# Patient Record
Sex: FEMALE | Race: WHITE | NOT HISPANIC OR LATINO | URBAN - METROPOLITAN AREA
[De-identification: names, ages, dates, MRNs, and addresses within clinical notes are randomized per-mention and may not be internally consistent; named-entity substitution may affect disease eponyms.]

---

## 2020-01-29 ENCOUNTER — OFFICE VISIT (OUTPATIENT)
Dept: FAMILY MEDICINE CLINIC | Facility: CLINIC | Age: 60
End: 2020-01-29
Payer: COMMERCIAL

## 2020-01-29 VITALS
WEIGHT: 158.2 LBS | RESPIRATION RATE: 16 BRPM | SYSTOLIC BLOOD PRESSURE: 128 MMHG | HEIGHT: 61 IN | HEART RATE: 82 BPM | DIASTOLIC BLOOD PRESSURE: 80 MMHG | OXYGEN SATURATION: 98 % | TEMPERATURE: 99 F | BODY MASS INDEX: 29.87 KG/M2

## 2020-01-29 DIAGNOSIS — L98.9 SKIN LESION: ICD-10-CM

## 2020-01-29 DIAGNOSIS — Z29.9 PREVENTIVE MEASURE: ICD-10-CM

## 2020-01-29 DIAGNOSIS — Z13.220 LIPID SCREENING: ICD-10-CM

## 2020-01-29 DIAGNOSIS — E66.09 CLASS 1 OBESITY DUE TO EXCESS CALORIES WITHOUT SERIOUS COMORBIDITY WITH BODY MASS INDEX (BMI) OF 30.0 TO 30.9 IN ADULT: ICD-10-CM

## 2020-01-29 DIAGNOSIS — Z12.31 SCREENING MAMMOGRAM, ENCOUNTER FOR: ICD-10-CM

## 2020-01-29 DIAGNOSIS — Z11.4 ENCOUNTER FOR SCREENING FOR HIV: ICD-10-CM

## 2020-01-29 DIAGNOSIS — Z00.00 WELL ADULT EXAM: Primary | ICD-10-CM

## 2020-01-29 DIAGNOSIS — M12.9 ARTHRITIS, MULTIPLE JOINT INVOLVEMENT: ICD-10-CM

## 2020-01-29 DIAGNOSIS — Z11.59 ENCOUNTER FOR HEPATITIS C SCREENING TEST FOR LOW RISK PATIENT: ICD-10-CM

## 2020-01-29 PROBLEM — M79.671 PAIN IN RIGHT FOOT: Status: ACTIVE | Noted: 2017-03-08

## 2020-01-29 PROBLEM — M24.573 EQUINUS CONTRACTURE OF ANKLE: Status: ACTIVE | Noted: 2017-03-08

## 2020-01-29 PROBLEM — G56.00 CARPAL TUNNEL SYNDROME: Status: ACTIVE | Noted: 2020-01-29

## 2020-01-29 PROBLEM — M21.40 PES PLANUS: Status: ACTIVE | Noted: 2017-03-08

## 2020-01-29 PROBLEM — M18.9 OSTEOARTHRITIS OF CARPOMETACARPAL (CMC) JOINT OF THUMB: Status: ACTIVE | Noted: 2020-01-29

## 2020-01-29 PROBLEM — M76.829 TIBIALIS POSTERIOR TENDINITIS: Status: ACTIVE | Noted: 2017-03-08

## 2020-01-29 PROBLEM — Z80.3 FAMILY HISTORY OF BREAST CANCER IN MOTHER: Status: ACTIVE | Noted: 2020-01-29

## 2020-01-29 PROCEDURE — 99386 PREV VISIT NEW AGE 40-64: CPT | Performed by: FAMILY MEDICINE

## 2020-01-29 PROCEDURE — 3008F BODY MASS INDEX DOCD: CPT | Performed by: FAMILY MEDICINE

## 2020-01-29 RX ORDER — MELATONIN
1000 DAILY
COMMUNITY
End: 2020-02-11

## 2020-01-29 RX ORDER — MULTIVITAMIN
1 TABLET ORAL DAILY
COMMUNITY
End: 2020-02-11

## 2020-01-29 RX ORDER — ASPIRIN 81 MG/1
81 TABLET, CHEWABLE ORAL DAILY
COMMUNITY
End: 2020-11-23

## 2020-01-29 NOTE — PATIENT INSTRUCTIONS
Please give info for Shingles  Heart Healthy Diet   AMBULATORY CARE:   A heart healthy diet  is an eating plan low in total fat, unhealthy fats, and sodium (salt)  A heart healthy diet helps decrease your risk for heart disease and stroke  Limit the amount of fat you eat to 25% to 35% of your total daily calories  Limit sodium to less than 2,300 mg each day  Healthy fats:  Healthy fats can help improve cholesterol levels  The risk for heart disease is decreased when cholesterol levels are normal  Choose healthy fats, such as the following:  · Unsaturated fat  is found in foods such as soybean, canola, olive, corn, and safflower oils  It is also found in soft tub margarine that is made with liquid vegetable oil  · Omega-3 fat  is found in certain fish, such as salmon, tuna, and trout, and in walnuts and flaxseed  Unhealthy fats:  Unhealthy fats can cause unhealthy cholesterol levels in your blood and increase your risk of heart disease  Limit unhealthy fats, such as the following:  · Cholesterol  is found in animal foods, such as eggs and lobster, and in dairy products made from whole milk  Limit cholesterol to less than 300 milligrams (mg) each day  You may need to limit cholesterol to 200 mg each day if you have heart disease  · Saturated fat  is found in meats, such as landry and hamburger  It is also found in chicken or turkey skin, whole milk, and butter  Limit saturated fat to less than 7% of your total daily calories  Limit saturated fat to less than 6% if you have heart disease or are at increased risk for it  · Trans fat  is found in packaged foods, such as potato chips and cookies  It is also in hard margarine, some fried foods, and shortening  Avoid trans fats as much as possible    Heart healthy foods and drinks to include:  Ask your dietitian or healthcare provider how many servings to have from each of the following food groups:  · Grains:      ¨ Whole-wheat breads, cereals, and pastas, and brown rice    ¨ Low-fat, low-sodium crackers and chips    · Vegetables:      ¨ Broccoli, green beans, green peas, and spinach    ¨ Collards, kale, and lima beans    ¨ Carrots, sweet potatoes, tomatoes, and peppers    ¨ Canned vegetables with no salt added    · Fruits:      ¨ Bananas, peaches, pears, and pineapple    ¨ Grapes, raisins, and dates    ¨ Oranges, tangerines, grapefruit, orange juice, and grapefruit juice    ¨ Apricots, mangoes, melons, and papaya    ¨ Raspberries and strawberries    ¨ Canned fruit with no added sugar    · Low-fat dairy products:      ¨ Nonfat (skim) milk, 1% milk, and low-fat almond, cashew, or soy milks fortified with calcium    ¨ Low-fat cheese, regular or frozen yogurt, and cottage cheese    · Meats and proteins , such as lean cuts of beef and pork (loin, leg, round), skinless chicken and turkey, legumes, soy products, egg whites, and nuts  Foods and drinks to limit or avoid:  Ask your dietitian or healthcare provider about these and other foods that are high in unhealthy fat, sodium, and sugar:  · Snack or packaged foods , such as frozen dinners, cookies, macaroni and cheese, and cereals with more than 300 mg of sodium per serving    · Canned or dry mixes  for cakes, soups, sauces, or gravies    · Vegetables with added sodium , such as instant potatoes, vegetables with added sauces, or regular canned vegetables    · Other foods high in sodium , such as ketchup, barbecue sauce, salad dressing, pickles, olives, soy sauce, and miso    · High-fat dairy foods  such as whole or 2% milk, cream cheese, or sour cream, and cheeses     · High-fat protein foods  such as high-fat cuts of beef (T-bone steaks, ribs), chicken or turkey with skin, and organ meats, such as liver    · Cured or smoked meats , such as hot dogs, landry, and sausage    · Unhealthy fats and oils , such as butter, stick margarine, shortening, and cooking oils such as coconut or palm oil    · Food and drinks high in sugar , such as soft drinks (soda), sports drinks, sweetened tea, candy, cake, cookies, pies, and doughnuts  Other diet guidelines to follow:   · Eat more foods containing omega-3 fats  Eat fish high in omega-3 fats at least 2 times a week  · Limit alcohol  Too much alcohol can damage your heart and raise your blood pressure  Women should limit alcohol to 1 drink a day  Men should limit alcohol to 2 drinks a day  A drink of alcohol is 12 ounces of beer, 5 ounces of wine, or 1½ ounces of liquor  · Choose low-sodium foods  High-sodium foods can lead to high blood pressure  Add little or no salt to food you prepare  Use herbs and spices in place of salt  · Eat more fiber  to help lower cholesterol levels  Eat at least 5 servings of fruits and vegetables each day  Eat 3 ounces of whole-grain foods each day  Legumes (beans) are also a good source of fiber  Lifestyle guidelines:   · Do not smoke  Nicotine and other chemicals in cigarettes and cigars can cause lung and heart damage  Ask your healthcare provider for information if you currently smoke and need help to quit  E-cigarettes or smokeless tobacco still contain nicotine  Talk to your healthcare provider before you use these products  · Exercise regularly  to help you maintain a healthy weight and improve your blood pressure and cholesterol levels  Ask your healthcare provider about the best exercise plan for you  Do not start an exercise program without asking your healthcare provider  Follow up with your healthcare provider as directed:  Write down your questions so you remember to ask them during your visits  © 2017 [employer] Information is for End User's use only and may not be sold, redistributed or otherwise used for commercial purposes  All illustrations and images included in CareNotes® are the copyrighted property of [employer]  or Mario Malave  The above information is an  only   It is not intended as medical advice for individual conditions or treatments  Talk to your doctor, nurse or pharmacist before following any medical regimen to see if it is safe and effective for you

## 2020-01-29 NOTE — PROGRESS NOTES
Assessment/Plan:     1  Well adult exam  See below  2  Arthritis, multiple joint involvement  Update labs  Exam consistent with OA  Encouraged her to continue her stretching and exercise to stay limber  Call if pain is worsening   - Comprehensive metabolic panel; Future  - Vitamin D 25 hydroxy; Future  - RF Screen w/ Reflex to Titer; Future  - Comprehensive metabolic panel  - Vitamin D 25 hydroxy    3  Class 1 obesity due to excess calories without serious comorbidity with body mass index (BMI) of 30 0 to 30 9 in adult  Encouraged diet and exercise to help for a healthier BMI  Her diet is very healthy - work on a little more exercise  4  Skin lesion  Pt already has derm appointment schedule in the next 2 months for lip lesion  ?[facility]     5  Screening mammogram, encounter for  Prescription:  - Mammo screening bilateral w cad; Future    6  Preventive measure  Refer to GYN for pap update   - Ambulatory referral to Gynecology; Future    7  Encounter for hepatitis C screening test for low risk patient  Agrees to   - Hepatitis C antibody; Future    8  Encounter for screening for HIV  Agrees to   - HIV 1/2 AG-AB combo    9  Lipid screening  Update labs   - Lipid panel; Future  - Lipid panel      Well adult exam  ·         Continue healthy diet   ·         Encourage exercise 4 times a week or more for minimum 30 minutes  ·         Continue to see dentist, wear seatbelt  ·         Health maintenance reviewed and up-to-date  Old records requested to get dates of colonoscopy and immunizations  Declines flu shot  Thinks tetanus is up to date  Agrees to update mammo and see GYN to update pap  Reviewed age appropriate health maintenance screenings and immunizations that are due, risks and benefits of these     Health Maintenance   Topic Date Due    Hepatitis C Screening  1960    MAMMOGRAM  1960    CRC Screening: Colonoscopy  1960    DTaP,Tdap,and Td Vaccines (1 - Tdap) 07/20/1972    HIV Screening 07/20/1976    Cervical Cancer Screening  07/20/1982    Influenza Vaccine  02/25/2020 (Originally 7/1/2019)    Depression Screening PHQ  01/29/2021    BMI: Followup Plan  01/29/2021    BMI: Adult  01/29/2021    Annual Physical  01/29/2021    Pneumococcal Vaccine: 65+ Years (1 of 2 - PCV13) 07/20/2026    Pneumococcal Vaccine: Pediatrics (0 to 5 Years) and At-Risk Patients (6 to 59 Years)  Aged Out    HIB Vaccine  Aged Out    Hepatitis B Vaccine  Aged Out    IPV Vaccine  Aged Out    Hepatitis A Vaccine  Aged Out    Meningococcal ACWY Vaccine  Aged Out    HPV Vaccine  Aged Out     Return in about 1 year (around 1/29/2021) for Annual physical     Subjective:    HPI Marlee Peabody is a 63 y o  female who presents today for a physical      Chief Complaint   Patient presents with    Physical Exam     est care, no concerns, PCP Dr Bree Moore, [facility] Dr Shelby Coats in [place],      PHQ-9 Depression Screening    PHQ-9:    Frequency of the following problems over the past two weeks:       Little interest or pleasure in doing things:  0 - not at all  Feeling down, depressed, or hopeless:  0 - not at all  PHQ-2 Score:  0        ---Above per clinical staff & reviewed  ---  Patient here today for a physical:    Diet: tries to eat right   B: oatmeal or cereal or eggs   L; half sandwich fruit and yogurt   D: soup or salad  Once a week pizza  Protein bars for snacks   Drinks water or tea   Exercise:  Joined gym - plans to go 4 times a week   Dental visits:  Yes   Seatbelt: yes     Concerns today:  arthritis in hands started 5 years ago maybe  Just the way it looks more bothersome  somein neck cracking  Some stiffness  Lesion on her lip   Concerned about her weight and cannot lose       Colonoscopy 2 -3 years ago - old records requested   Believes immunizations are up to date - also requested  Declines flu   Agrees to [facility] and to update gyn exam   Menopause about 53 ed[age]   stiffness and nodules in hands   Stretches every day     Sleeps well  No snoring  Weight same           The following portions of the patient's history were reviewed and updated as appropriate: allergies, current medications, past family history, past medical history, past social history, past surgical history and problem list      Current Outpatient Medications   Medication Sig Dispense Refill    aspirin 81 mg chewable tablet Chew 81 mg daily      Calcium Carbonate (CALTRATE 600 PO) Take 1 capsule by mouth daily      cholecalciferol (VITAMIN D3) 1,000 units tablet Take 1,000 Units by mouth daily      Multiple Vitamin (MULTIVITAMIN) tablet Take 1 tablet by mouth daily      Omega-3 Fatty Acids (FISH OIL PO) Take 1 capsule by mouth daily       No current facility-administered medications for this visit  Objective:      /80   Pulse 82   Temp 99 °F (37 2 °C) (Tympanic)   Resp 16   Ht 5' 0 5" (1 537 m)   Wt 71 8 kg (158 lb 3 2 oz)   SpO2 98%   BMI 30 39 kg/m²   BP Readings from Last 3 Encounters:   01/29/20 128/80     Wt Readings from Last 3 Encounters:   01/29/20 71 8 kg (158 lb 3 2 oz)       Review of Systems  ROS:  all others negative - no chest pain, SOB, normal urine and bowels  no GERD  sleeping well  mood good  + skin lesion new in last few months  + joint pain and stiffness edwar hands and neck  Physical Exam   HENT:   Mouth/Throat:          Constitutional: she appears well-developed and well-nourished  HENT: Head: Normocephalic  Right Ear: External ear normal  Tympanic membrane normal    Left Ear: External ear normal  Tympanic membrane normal    Nose: Nose normal  No mucosal edema, No rhinorrhea  Right sinus exhibits no maxillary sinus tenderness  Left sinus exhibits no maxillary sinus tenderness  Mouth/Throat: Oropharynx is clear and moist    Eyes: Normal conjunctiva  No erythema  No discharge  Neck: No pain on exam  Neck supple  Cardiovascular: Normal rate, regular rhythm and normal heart sounds  Pulmonary/Chest: Effort normal and breath sounds normal  No wheezes  No rales  No rhonchi  Abdominal: Soft  Bowel sounds are normal  There is no tenderness  Musculoskeletal: she exhibits no edema  Ayana's & Heberden's nodes  No erythema swelling or increased warmth  Neck full range of motion  Lymphadenopathy: she has no cervical adenopathy  Neurological: she  is alert and oriented to person, place, and time  Skin: Skin is warm and dry  No rashes  Psychiatric: she  has a normal mood and affect  her behavior is normal  Thought content normal    Vitals reviewed  BMI Counseling: Body mass index is 30 39 kg/m²  The BMI is above normal  Nutrition recommendations include decreasing portion sizes  Exercise recommendations include exercising 3-5 times per week

## 2020-02-06 DIAGNOSIS — E87.0 HYPERNATREMIA: Primary | ICD-10-CM

## 2020-02-06 DIAGNOSIS — E78.00 HYPERCHOLESTEROLEMIA: ICD-10-CM

## 2020-02-06 DIAGNOSIS — E67.3 HIGH VITAMIN D LEVEL: ICD-10-CM

## 2020-02-06 DIAGNOSIS — R94.4 DECREASED GLOMERULAR FILTRATION RATE (GFR): ICD-10-CM

## 2020-02-08 LAB
25(OH)D3+25(OH)D2 SERPL-MCNC: 107 NG/ML (ref 30–100)
ALBUMIN SERPL-MCNC: 4.9 G/DL (ref 3.8–4.9)
ALBUMIN/GLOB SERPL: 2 {RATIO} (ref 1.2–2.2)
ALP SERPL-CCNC: 71 IU/L (ref 39–117)
ALT SERPL-CCNC: 20 IU/L (ref 0–32)
AST SERPL-CCNC: 23 IU/L (ref 0–40)
BILIRUB SERPL-MCNC: 0.5 MG/DL (ref 0–1.2)
BUN SERPL-MCNC: 20 MG/DL (ref 6–24)
BUN/CREAT SERPL: 20 (ref 9–23)
CALCIUM SERPL-MCNC: 9.6 MG/DL (ref 8.7–10.2)
CHLORIDE SERPL-SCNC: 104 MMOL/L (ref 96–106)
CHOLEST SERPL-MCNC: 235 MG/DL (ref 100–199)
CO2 SERPL-SCNC: 22 MMOL/L (ref 20–29)
CREAT SERPL-MCNC: 1.01 MG/DL (ref 0.57–1)
GLOBULIN SER-MCNC: 2.5 G/DL (ref 1.5–4.5)
GLUCOSE SERPL-MCNC: 100 MG/DL (ref 65–99)
HCV AB S/CO SERPL IA: <0.1 S/CO RATIO (ref 0–0.9)
HDLC SERPL-MCNC: 72 MG/DL
HIV 1+2 AB+HIV1 P24 AG SERPL QL IA: NON REACTIVE
LDLC SERPL CALC-MCNC: 144 MG/DL (ref 0–99)
POTASSIUM SERPL-SCNC: 4.3 MMOL/L (ref 3.5–5.2)
PROT SERPL-MCNC: 7.4 G/DL (ref 6–8.5)
RHEUMATOID FACT SERPL-ACNC: <10 IU/ML (ref 0–13.9)
SL AMB EGFR AFRICAN AMERICAN: 71 ML/MIN/1.73
SL AMB EGFR NON AFRICAN AMERICAN: 62 ML/MIN/1.73
SL AMB VLDL CHOLESTEROL CALC: 19 MG/DL (ref 5–40)
SODIUM SERPL-SCNC: 147 MMOL/L (ref 134–144)
TRIGL SERPL-MCNC: 94 MG/DL (ref 0–149)

## 2020-02-11 NOTE — PROGRESS NOTES
Please call pt - her blood work came back with high sodium and very high vitamin D    -Please  confirm how much vitamin D she is taking  We have that she is taking 1000 iu along with what is in her calcium  (confirm what that dose is)  -also ask how much water/fluids she drinks a day  ?little ?excess  The sodium being high usually comes from not having enough fluids  - her cholesterol is high - she can work on diet and exercise for this    I want her to hold all vitamin D (multivitamin, supplements, calcium)   Repeat blood work in 2 weeks - do not fast for this test and be sure to be hydrated as usual

## 2020-02-11 NOTE — PROGRESS NOTES
Patient called back and stated that she is taking 5000 iu of Vitamin D, her multi vitamin has 1000 iu of vitamin D, and her calcium with vitamin D has 800 iu of vitamin D  Her calcium is 600 mg  I informed patient of results, patient understood and will stop taking her supplements until getting blood work done again

## 2020-02-13 ENCOUNTER — HOSPITAL ENCOUNTER (OUTPATIENT)
Dept: RADIOLOGY | Age: 60
Discharge: HOME | End: 2020-02-13
Attending: FAMILY MEDICINE
Payer: COMMERCIAL

## 2020-02-13 VITALS — BODY MASS INDEX: 30.23 KG/M2 | HEIGHT: 60 IN | WEIGHT: 154 LBS

## 2020-02-13 DIAGNOSIS — Z12.31 SCREENING MAMMOGRAM, ENCOUNTER FOR: ICD-10-CM

## 2020-02-13 PROCEDURE — 77067 SCR MAMMO BI INCL CAD: CPT

## 2020-02-19 PROBLEM — K57.90 DIVERTICULOSIS: Status: ACTIVE | Noted: 2020-02-19

## 2020-02-28 DIAGNOSIS — E55.9 VITAMIN D DEFICIENCY: Primary | ICD-10-CM

## 2020-03-02 ENCOUNTER — TELEPHONE (OUTPATIENT)
Dept: FAMILY MEDICINE CLINIC | Facility: CLINIC | Age: 60
End: 2020-03-02

## 2020-03-02 LAB
25(OH)D3+25(OH)D2 SERPL-MCNC: 106 NG/ML (ref 30–100)
ALBUMIN SERPL-MCNC: 4.9 G/DL (ref 3.8–4.9)
ALBUMIN/GLOB SERPL: 2 {RATIO} (ref 1.2–2.2)
ALP SERPL-CCNC: 70 IU/L (ref 39–117)
ALT SERPL-CCNC: 14 IU/L (ref 0–32)
AST SERPL-CCNC: 21 IU/L (ref 0–40)
BILIRUB SERPL-MCNC: 0.4 MG/DL (ref 0–1.2)
BUN SERPL-MCNC: 18 MG/DL (ref 6–24)
BUN/CREAT SERPL: 18 (ref 9–23)
CALCIUM SERPL-MCNC: 9.4 MG/DL (ref 8.7–10.2)
CHLORIDE SERPL-SCNC: 101 MMOL/L (ref 96–106)
CO2 SERPL-SCNC: 21 MMOL/L (ref 20–29)
CREAT SERPL-MCNC: 0.98 MG/DL (ref 0.57–1)
GLOBULIN SER-MCNC: 2.4 G/DL (ref 1.5–4.5)
GLUCOSE SERPL-MCNC: 97 MG/DL (ref 65–99)
PHOSPHATE SERPL-MCNC: 3.5 MG/DL (ref 3–4.3)
POTASSIUM SERPL-SCNC: 4.2 MMOL/L (ref 3.5–5.2)
PROT SERPL-MCNC: 7.3 G/DL (ref 6–8.5)
PTH-INTACT SERPL-MCNC: 30 PG/ML (ref 15–65)
SL AMB EGFR AFRICAN AMERICAN: 74 ML/MIN/1.73
SL AMB EGFR NON AFRICAN AMERICAN: 64 ML/MIN/1.73
SODIUM SERPL-SCNC: 137 MMOL/L (ref 134–144)

## 2020-03-04 NOTE — RESULT ENCOUNTER NOTE
Call patient with lab results  Please ask her again if she has stopped all of her supplements for the last few weeks  (multivitamin, calcium, vitamin D - anything that contains vitamin D) Her levels did not come down at all  Her sodium is better   The other labs I ordered are normal

## 2020-07-14 ENCOUNTER — TELEPHONE (OUTPATIENT)
Dept: FAMILY MEDICINE CLINIC | Facility: CLINIC | Age: 60
End: 2020-07-14

## 2020-07-14 NOTE — TELEPHONE ENCOUNTER
Her  will be traveling for golf  He is traveling to the [place] [id number] and will be with others from [place], [place] and [place]  They would like to talk with Dr Tom Everett for her advise on him going due to [place]      Please advise    Caller:Leanna  Phone#:[phone]

## 2020-11-13 ENCOUNTER — TELEPHONE (OUTPATIENT)
Dept: FAMILY MEDICINE CLINIC | Facility: CLINIC | Age: 60
End: 2020-11-13

## 2020-11-13 DIAGNOSIS — H26.9 CATARACT OF BOTH EYES, UNSPECIFIED CATARACT TYPE: Primary | ICD-10-CM

## 2020-11-23 ENCOUNTER — OFFICE VISIT (OUTPATIENT)
Dept: FAMILY MEDICINE CLINIC | Facility: CLINIC | Age: 60
End: 2020-11-23
Payer: COMMERCIAL

## 2020-11-23 ENCOUNTER — TELEPHONE (OUTPATIENT)
Dept: FAMILY MEDICINE CLINIC | Facility: CLINIC | Age: 60
End: 2020-11-23

## 2020-11-23 VITALS
OXYGEN SATURATION: 97 % | TEMPERATURE: 99.1 F | HEIGHT: 62 IN | WEIGHT: 148.8 LBS | SYSTOLIC BLOOD PRESSURE: 132 MMHG | HEART RATE: 86 BPM | DIASTOLIC BLOOD PRESSURE: 60 MMHG | RESPIRATION RATE: 20 BRPM | BODY MASS INDEX: 27.38 KG/M2

## 2020-11-23 DIAGNOSIS — H25.013 CORTICAL AGE-RELATED CATARACT OF BOTH EYES: ICD-10-CM

## 2020-11-23 DIAGNOSIS — E78.00 HYPERCHOLESTEROLEMIA: ICD-10-CM

## 2020-11-23 DIAGNOSIS — Z01.818 PRE-OP EXAMINATION: Primary | ICD-10-CM

## 2020-11-23 DIAGNOSIS — E67.3 HIGH VITAMIN D LEVEL: ICD-10-CM

## 2020-11-23 DIAGNOSIS — Z12.31 SCREENING MAMMOGRAM, ENCOUNTER FOR: ICD-10-CM

## 2020-11-23 PROCEDURE — 99243 OFF/OP CNSLTJ NEW/EST LOW 30: CPT | Performed by: FAMILY MEDICINE

## 2020-12-21 PROBLEM — H26.9 CATARACTS, BILATERAL: Status: ACTIVE | Noted: 2020-12-21

## 2021-02-14 ENCOUNTER — TELEPHONE (OUTPATIENT)
Dept: FAMILY MEDICINE CLINIC | Facility: CLINIC | Age: 61
End: 2021-02-14

## 2021-02-15 NOTE — TELEPHONE ENCOUNTER
Placed call to patient, left a detailed message (okay per communication form) advising of lab follow up instructions  Requested a return call if there was additional questions or concerns

## 2021-02-23 LAB
25(OH)D3+25(OH)D2 SERPL-MCNC: 38.9 NG/ML (ref 30–100)
ALBUMIN SERPL-MCNC: 4.9 G/DL (ref 3.8–4.9)
ALBUMIN/GLOB SERPL: 2 {RATIO} (ref 1.2–2.2)
ALP SERPL-CCNC: 87 IU/L (ref 39–117)
ALT SERPL-CCNC: 14 IU/L (ref 0–32)
AST SERPL-CCNC: 17 IU/L (ref 0–40)
BILIRUB SERPL-MCNC: 0.4 MG/DL (ref 0–1.2)
BUN SERPL-MCNC: 17 MG/DL (ref 6–24)
BUN/CREAT SERPL: 19 (ref 9–23)
CALCIUM SERPL-MCNC: 9.4 MG/DL (ref 8.7–10.2)
CHLORIDE SERPL-SCNC: 104 MMOL/L (ref 96–106)
CHOLEST SERPL-MCNC: 240 MG/DL (ref 100–199)
CO2 SERPL-SCNC: 25 MMOL/L (ref 20–29)
CREAT SERPL-MCNC: 0.89 MG/DL (ref 0.57–1)
GLOBULIN SER-MCNC: 2.4 G/DL (ref 1.5–4.5)
GLUCOSE SERPL-MCNC: 96 MG/DL (ref 65–99)
HDLC SERPL-MCNC: 71 MG/DL
LDLC SERPL CALC-MCNC: 148 MG/DL (ref 0–99)
POTASSIUM SERPL-SCNC: 4.4 MMOL/L (ref 3.5–5.2)
PROT SERPL-MCNC: 7.3 G/DL (ref 6–8.5)
SL AMB EGFR AFRICAN AMERICAN: 82 ML/MIN/1.73
SL AMB EGFR NON AFRICAN AMERICAN: 71 ML/MIN/1.73
SL AMB VLDL CHOLESTEROL CALC: 21 MG/DL (ref 5–40)
SODIUM SERPL-SCNC: 142 MMOL/L (ref 134–144)
TRIGL SERPL-MCNC: 118 MG/DL (ref 0–149)

## 2021-02-26 ENCOUNTER — OFFICE VISIT (OUTPATIENT)
Dept: FAMILY MEDICINE CLINIC | Facility: CLINIC | Age: 61
End: 2021-02-26
Payer: COMMERCIAL

## 2021-02-26 VITALS
HEART RATE: 81 BPM | BODY MASS INDEX: 27.82 KG/M2 | RESPIRATION RATE: 16 BRPM | OXYGEN SATURATION: 97 % | DIASTOLIC BLOOD PRESSURE: 80 MMHG | SYSTOLIC BLOOD PRESSURE: 138 MMHG | TEMPERATURE: 99.8 F | WEIGHT: 151.2 LBS | HEIGHT: 62 IN

## 2021-02-26 DIAGNOSIS — E78.00 HYPERCHOLESTEREMIA: ICD-10-CM

## 2021-02-26 DIAGNOSIS — Z29.9 PREVENTIVE MEASURE: ICD-10-CM

## 2021-02-26 DIAGNOSIS — E55.9 VITAMIN D DEFICIENCY: ICD-10-CM

## 2021-02-26 DIAGNOSIS — Z00.00 WELL ADULT EXAM: Primary | ICD-10-CM

## 2021-02-26 PROCEDURE — 3725F SCREEN DEPRESSION PERFORMED: CPT | Performed by: FAMILY MEDICINE

## 2021-02-26 PROCEDURE — 99396 PREV VISIT EST AGE 40-64: CPT | Performed by: FAMILY MEDICINE

## 2021-02-26 PROCEDURE — 3008F BODY MASS INDEX DOCD: CPT | Performed by: FAMILY MEDICINE

## 2021-02-26 NOTE — PROGRESS NOTES
Assessment/Plan:     1  Well adult exam  See below     2  Hypercholesteremia  Ratio healthy  adcvd risk 3 3%  Work on diet and exercise   - Comprehensive metabolic panel; Future  - Lipid panel; Future  - Vitamin D 25 hydroxy; Future  - Comprehensive metabolic panel  - Lipid panel  - Vitamin D 25 hydroxy    3  Vitamin D deficiency  Normal since stopping all supplements  - Comprehensive metabolic panel; Future  - Lipid panel; Future  - Vitamin D 25 hydroxy; Future  - Comprehensive metabolic panel  - Lipid panel  - Vitamin D 25 hydroxy    4  Cervical cancer screening  Refer   - Ambulatory referral to Obstetrics / Gynecology; Future      Well adult exam  ·         Continue healthy diet   ·         Encourage exercise 4 times a week or more for minimum 30 minutes  ·         Continue to see dentist, wear seatbelt  ·         Health maintenance reviewed and up-to-date  Considering COVID vaccine but hesitant  Refer to GYN and mammo  Reviewed age appropriate health maintenance screenings and immunizations that are due, risks and benefits of these  Health Maintenance   Topic Date Due    Cervical Cancer Screening  07/20/1982    BMI: Followup Plan  01/29/2021    Annual Physical  01/29/2021    MAMMOGRAM  02/13/2021    Influenza Vaccine (1) 06/30/2021 (Originally 9/1/2020)    DTaP,Tdap,and Td Vaccines (1 - Tdap) 11/23/2021 (Originally 7/20/1982)    Depression Screening PHQ  02/26/2022    BMI: Adult  02/26/2022    Colorectal Cancer Screening  03/24/2026    HIV Screening  Completed    Hepatitis C Screening  Completed    Pneumococcal Vaccine: Pediatrics (0 to 5 Years) and At-Risk Patients (6 to 59 Years)  Aged Out    HIB Vaccine  Aged Out    Hepatitis B Vaccine  Aged Out    IPV Vaccine  Aged Out    Hepatitis A Vaccine  Aged Out    Meningococcal ACWY Vaccine  Aged Out    HPV Vaccine  Aged Out     Return in about 1 year (around 2/26/2022) for Annual physical     Subjective:    TIMUR Pereira is a 62 y o  female who presents today for a physical      Chief Complaint   Patient presents with    Physical Exam    Immunizations     Patient denied shingrex, Tdap, Flu vaccines      PHQ-9 Depression Screening    PHQ-9:   Frequency of the following problems over the past two weeks:      Little interest or pleasure in doing things: 0 - not at all  Feeling down, depressed, or hopeless: 0 - not at all  Trouble falling or staying asleep, or sleeping too much: 0 - not at all  Feeling tired or having little energy: 0 - not at all  Poor appetite or overeatin - not at all  Feeling bad about yourself - or that you are a failure or have let yourself or your family down: 0 - not at all  Trouble concentrating on things, such as reading the newspaper or watching television: 0 - not at all  Moving or speaking so slowly that other people could have noticed  Or the opposite - being so fidgety or restless that you have been moving around a lot more than usual: 0 - not at all  Thoughts that you would be better off dead, or of hurting yourself in some way: 0 - not at all  PHQ-2 Score: 0  PHQ-9 Score: 0              ---Above per clinical staff & reviewed  ---  Patient here today for a physical:    Diet: trying   Exercise: Moderate pilate, and stretching every morning, walking when she can  Dental visits:  Overdue   Seatbelt: yes     Feb labs CMP normal, cholesterol 235 up to 240, triglycerides 118, HDL 71,  up to 148  Vitamin-D 106 now down to 38 9  Concerns today:  Wants to be sharp           The following portions of the patient's history were reviewed and updated as appropriate: allergies, current medications, past family history, past medical history, past social history, past surgical history and problem list      No current outpatient medications on file  No current facility-administered medications for this visit           Objective:      /80   Pulse 81   Temp 99 8 °F (37 7 °C)   Resp 16   Ht 5' 1 97" (1 574 m)   Wt 68 6 kg (151 lb 3 2 oz)   SpO2 97%   BMI 27 68 kg/m²   BP Readings from Last 3 Encounters:   02/26/21 138/80   11/23/20 132/60   01/29/20 128/80     Wt Readings from Last 3 Encounters:   02/26/21 68 6 kg (151 lb 3 2 oz)   11/23/20 67 5 kg (148 lb 12 8 oz)   02/13/20 69 9 kg (154 lb)       Review of Systems  ROS:  all others negative - no chest pain, SOB, normal urine and bowels  no GERD  sleeping well  mood good  Physical Exam   Constitutional: she appears well-developed and well-nourished  HENT: Head: Normocephalic  Right Ear: External ear normal  Tympanic membrane normal    Left Ear: External ear normal  Tympanic membrane normal    Nose: Nose normal  No mucosal edema, No rhinorrhea  Right sinus exhibits no maxillary sinus tenderness  Left sinus exhibits no maxillary sinus tenderness  Mouth/Throat: Oropharynx is clear and moist    Eyes: Normal conjunctiva  No erythema  No discharge  Neck: No pain on exam  Neck supple  Cardiovascular: Normal rate, regular rhythm and normal heart sounds  Pulmonary/Chest: Effort normal and breath sounds normal  No wheezes  No rales  No rhonchi  Abdominal: Soft  Bowel sounds are normal  There is no tenderness  Musculoskeletal: she exhibits no edema  Lymphadenopathy: she has no cervical adenopathy  Neurological: she  is alert and oriented to person, place, and time  Skin: Skin is warm and dry  No rashes  Psychiatric: she  has a normal mood and affect  her behavior is normal  Thought content normal    Vitals reviewed  BMI Counseling: Body mass index is 27 68 kg/m²  The BMI is above normal  Nutrition recommendations include decreasing portion sizes  Exercise recommendations include exercising 3-5 times per week

## 2021-02-26 NOTE — PATIENT INSTRUCTIONS
Cholesterol tips     Foods to avoid:  ·         Cut back on saturated fats and trans fats - also called hydrogenated fats  ·         Fatty meats, cold cuts, landry, sausage  ·         Creamy sauces and fatty gravies  ·         Fried foods  ·         Egg yolks  ·         Shrimp  ·         Coconuts  ·         Shortening, butter, coconut oil, palm oil, hydrogenated oils, partially       hydrogenated margarine and lard   ·         High fat dairy products such as whole milk, cheese, ice cream  ·         Simple sugars (found in soft drinks, candy, cakes, cookies and other     baked goods)      Healthier Choices:  ·         Lean beef, skinless white meat poultry, fish  ·         Tomato sauce, vegetable purée  ·         Dried fruit, bagels, bread with jam  ·         Baked, boiled, steamed, roasted foods  ·         Egg whites or egg substitute  ·         Canola-based margarines (e g  Benacol, I can't Believe it's not Butter,   Smart Balance)    ·         Low-fat or nonfat dairy products such as 1% or fat free milk, reduced fat           cheese, nonfat frozen yogurt     Foods that will help lower cholesterol:  ·         High-fiber foods that contain lots of oats, barley, whole grains  ·         Beans  ·         Foods rich in antioxidants, such as fruits and vegetables - try for 5 -6     servings per day   ·         Cornmeal, popcorn  ·         Fatty fish such as salmon, dolly, white albacore tuna, sardines,        mackerel, tilapia  ·         Foods rich in plant sterols, such as nuts like walnuts and almonds  ·         Pumpkin, sesame, or sunflower seeds   ·         Foods high in omega-3 fatty acids, such as avocado, flax seeds, olive oil          and canola oil      Increase your exercise  ·         To keep your heart healthy try for  30 minutes of repetitive exercise daily,         5 days per week (walking, running, cycling, stationary bike,    elliptical,         stair-stepper, swimming, etc ) or 25 minutes of high intensity           exercise 3      days per week  ·         If you need to lose weight or lower your blood pressure, your goal should         be 40 minutes or moderate to high intensity exercise 3 -4 times a week  ·         You can start this slowly with a few 10 minute sessions  ·         Adapt exercise routine to orthopedic limitations  ·         Stay aerobic  ·         You should be able to talk to the person next to you  Or, if alone, you    should be able to whistle or sing  ·         Remember some activity is better than none! ·         Start slow but keep it up! Examples of Moderate Intensity:  Walking briskly (3 miles per hour or faster, but not race-walking)   Water aerobics   Bicycling slower than 10 miles per hour   Tennis (doubles)   Ballroom dancing   General gardening                          Examples of Vigorous Intensity:  Race walking, jogging, or running   Swimming laps   Tennis (singles)   Aerobic dancing   Bicycling 10 miles per hour or faster   Jumping rope   Heavy gardening (continuous digging or hoeing)   Hiking uphill or with a heavy backpack     -Go to www heart  org [heart  org] for more tips      A Mediterranean diet appears to reduce the risk of cardiovascular events (heart attacks and strokes) and diabetes  There is no single Mediterranean diet, but such diets are typically high in fruits, vegetables, whole grains, beans, nuts, and seeds and include olive oil as an important source of fat; there are typically low to moderate amounts of fish, poultry, and dairy products, and there is little red meat  WHAT TO EAT:     Fruits  Vegetables  Nuts  Seeds  Legumes  Whole grains  Potatoes  Multigrain Bread  Herbs  Fish  Seafood  Spices  Extra virgin olive oil    WHAT YOU SHOULD EAT IN MODERATION:     Poultry  Eggs  Cheese  Yogurt        EAT IT RARELY:  Red Meat FOODS YOU SHOULD AVOID AT ANY COST:     1  Sugar-sweetened beverages  2   Added sugars- Soda, candies, ice cream, table sugar  3  Processed meats- Processed sausages, hot dogs, processed red meat, etc   4  Refined grains- White bread, Pasta made with refined wheat, etc   5  Trans Fats- Found in margarine and various processed foods  6  Refined oils- Soybean oil, canola oil, cottonseed oil  7  Highly Processed Foods: Anything that has a label of low fat, Diet, or which looks like it is made in a factory  WHAT TO DRINK?:     Water is your way to go to keep yourself hydrated and fresh  However, a moderate amount of red wine, along with the water, is also included in the Mediterranean diet  The amount of red wine that can be consumed in a day is one glass and not more than that  Coffee and tea are acceptable but obviously without sugar  Fruit juices and sugar-sweetened beverages are highly discouraged  FOOD OPTIONS IN MEDITERRANEAN DIET:         VEGETABLES:  Tomatoes  Broccoli  Kale  Spinach  Onions  Cauliflower  Carrots  [place] sprouts  cucumbers, etc     FRUITS:  Apples  Bananas  Oranges  Pears  Strawberries  Grapes  Dates  Figs  Melons  peaches, etc     NUTS & SEEDS  Almonds  Walnuts  macadamia nuts  Hazelnuts  Cashews  sunflower seeds  pumpkin seeds, etc        LEGUMES:  Beans  Peas  Lentils  Pulses  Peanuts  chickpeas, etc      TUBERS:  Potatoes, sweet potatoes  Turnips, yams, etc    WHOLE GRAINS:  Whole oats  brown rice  [place]  Barley  Corn  Buckwheat  whole wheat  whole-grain bread  Pasta    FISH & SEA FOOD:  [place]  Sardines  [place]  [employer], etc     POULTRY:  Chicken  Duck  turkey, etc      FGGS:  Chicken  [place] eggs  Duck eggs  DAIRY:  Cheese  Yogurt  [place] yogurt, etc       HERBS & SPICES:  Garlic  Basil  Mint  Rosemary  Matt  Nutmeg  Cinnamon  Pepper, etc        HEALTHY FATS:  Extra virgin olive oil  Olives  Avocados  Avocado oil                Local agencies that provide help with meals and other assistance for seniors:   ·         [employer] [phone],  [phone]  [facility] became the pioneer organization whose core values are dedicated to being a comprehensive personal service and valuable resource for families during the placement of a love one    ·         [address] [facility] in [address] [facility],[address]  When you need elder care services for you or your loved one, you will find a list of providers categorized by their services  LVAIP helps match you with local services providers  [facility] on Aging and Adult Services  [address] [facility] 98 [facility]  [address] [facility] on [address] [facility] [address] [place], [place]   [address] [phone] [facility] [address] on [address] [facility],  [address] [facility] on [address] 27[address] [facility]  [facility], [address] [place]  [phone] [facility]  Daniel Hedrick 1465         [facility] MEDICAL [place]  [address] [facility], [address]  [address] [facility]  [address] [place] [facility]  [facility], [address] [place]  [phone]      Adult  Services:  www aging  pa gov [aging  pa gov]   ·         [facility] Adult [address]  [place], [place], [address] [phone] Website [[employer]]   ·         [facility] [address] [facility] , [address] [place], [place], [address] [phone] Website [sarahcareMedia LiÂ²ght Entertainment] View Summary Lopez  pa gov]   ·         [address] [facility] [address] [place], [place], [address] [phone] Website [seniorlifeleLocal Motors]   ·         Taisha Mealing at [address]  [place], [place], [address] [phone] Website Zach Brain  org]     [facility] Locations:  [facility]   [address]   [facility], [address] [place] [address]   [phone] [facility] [address]   [place], 98 [facility]   [phone]   Directions   Saba Jenkins [address] [facility]   [place] [address] [facility]   [phone]   Directions    [facility] Active Life Evans  org]  [address]   [facility] [address] [facility]   [phone]   Website: www lvactivelife  org [lvactivelife  org]   Directions      [facility] 23, [address]   [phone]   Directions    [facility]   [facility], [address]   [phone]   Directions      [facility]   [address]   Þ[facility], [address]  [phone]   [address]   [address] [place] [address] [address]   [phone]   Directions      [address] [facility] [address]   [address] [facility], [facility]   [phone]   Directions    [facility]   G   [address] [facility]  [address]   [place], [address]   [phone]   Directions      [facility]  [address] [facility], 210 [facility]  [phone]  Directions

## 2021-04-13 DIAGNOSIS — Z23 ENCOUNTER FOR IMMUNIZATION: ICD-10-CM

## 2021-08-03 ENCOUNTER — IMMUNIZATIONS (OUTPATIENT)
Dept: FAMILY MEDICINE CLINIC | Facility: HOSPITAL | Age: 61
End: 2021-08-03

## 2021-08-03 DIAGNOSIS — Z23 ENCOUNTER FOR IMMUNIZATION: Primary | ICD-10-CM

## 2021-08-03 PROCEDURE — 0011A SARS-COV-2 / COVID-19 MRNA VACCINE (MODERNA) 100 MCG: CPT

## 2021-08-03 PROCEDURE — 91301 SARS-COV-2 / COVID-19 MRNA VACCINE (MODERNA) 100 MCG: CPT

## 2021-08-31 ENCOUNTER — IMMUNIZATIONS (OUTPATIENT)
Dept: FAMILY MEDICINE CLINIC | Facility: HOSPITAL | Age: 61
End: 2021-08-31

## 2021-08-31 DIAGNOSIS — Z23 ENCOUNTER FOR IMMUNIZATION: Primary | ICD-10-CM

## 2021-08-31 PROCEDURE — 0012A SARS-COV-2 / COVID-19 MRNA VACCINE (MODERNA) 100 MCG: CPT

## 2021-08-31 PROCEDURE — 91301 SARS-COV-2 / COVID-19 MRNA VACCINE (MODERNA) 100 MCG: CPT

## 2021-10-13 ENCOUNTER — HOSPITAL ENCOUNTER (OUTPATIENT)
Dept: RADIOLOGY | Age: 61
Discharge: HOME | End: 2021-10-13
Payer: COMMERCIAL

## 2021-10-13 VITALS — WEIGHT: 151 LBS | BODY MASS INDEX: 28.51 KG/M2 | HEIGHT: 61 IN

## 2021-10-13 DIAGNOSIS — Z12.31 SCREENING MAMMOGRAM, ENCOUNTER FOR: ICD-10-CM

## 2021-10-13 PROCEDURE — 77067 SCR MAMMO BI INCL CAD: CPT

## 2021-10-13 PROCEDURE — 77063 BREAST TOMOSYNTHESIS BI: CPT

## 2021-10-19 ENCOUNTER — HOSPITAL ENCOUNTER (OUTPATIENT)
Dept: ULTRASOUND IMAGING | Facility: CLINIC | Age: 61
Discharge: HOME | End: 2021-10-19
Payer: COMMERCIAL

## 2021-10-19 ENCOUNTER — HOSPITAL ENCOUNTER (OUTPATIENT)
Dept: MAMMOGRAPHY | Facility: CLINIC | Age: 61
Discharge: HOME | End: 2021-10-19
Payer: COMMERCIAL

## 2021-10-19 VITALS — BODY MASS INDEX: 28.51 KG/M2 | HEIGHT: 61 IN | WEIGHT: 151 LBS

## 2021-10-19 DIAGNOSIS — R92.8 ABNORMAL SCREENING MAMMOGRAM: ICD-10-CM

## 2021-10-19 PROCEDURE — G0279 TOMOSYNTHESIS, MAMMO: HCPCS

## 2021-10-19 PROCEDURE — 76642 ULTRASOUND BREAST LIMITED: CPT

## 2021-10-19 PROCEDURE — 77065 DX MAMMO INCL CAD UNI: CPT

## 2022-02-03 ENCOUNTER — TELEPHONE (OUTPATIENT)
Dept: FAMILY MEDICINE CLINIC | Facility: CLINIC | Age: 62
End: 2022-02-03

## 2022-02-03 NOTE — TELEPHONE ENCOUNTER
Patient called the office did have an appt for ringing in the ears, Patient is unable to make it in today  Patient is no sure when the ringing in the ears started  Patient declined overdose on Asprin  States that she takes a baby Asprin every morning  Declined foreign body in the ear, Declined ear pain  Declined history of recent head injury or recent ear surgery  Declined Dizziness or vertigo  Declined nausea or vomiting, Declined persistent ear pain, Declined ear drainage  Declined hearing loss  Declined any URI  Declined any congestion  Patient states that she does have a fullness feeling in both of her ears  Declined was build up  Declined history of meniere disease  Triaged using Telephone Triage Protocols for Nurse, Fifth Edition, by Kristy Signs  Triaged via the Ear Ringing  category, page  211  Outcome: Call PCP if it does not improve and provided patient with Home care instructions  pateint would like providers recommendation  Will forward to provider

## 2022-02-04 ENCOUNTER — OFFICE VISIT (OUTPATIENT)
Dept: FAMILY MEDICINE CLINIC | Facility: CLINIC | Age: 62
End: 2022-02-04
Payer: COMMERCIAL

## 2022-02-04 VITALS
DIASTOLIC BLOOD PRESSURE: 60 MMHG | WEIGHT: 159 LBS | OXYGEN SATURATION: 99 % | HEART RATE: 119 BPM | HEIGHT: 61 IN | RESPIRATION RATE: 16 BRPM | TEMPERATURE: 99 F | BODY MASS INDEX: 30.02 KG/M2 | SYSTOLIC BLOOD PRESSURE: 138 MMHG

## 2022-02-04 DIAGNOSIS — F41.8 SITUATIONAL ANXIETY: ICD-10-CM

## 2022-02-04 DIAGNOSIS — M25.50 MULTIPLE JOINT PAIN: ICD-10-CM

## 2022-02-04 DIAGNOSIS — F51.09 SITUATIONAL INSOMNIA: ICD-10-CM

## 2022-02-04 DIAGNOSIS — H93.13 TINNITUS OF BOTH EARS: Primary | ICD-10-CM

## 2022-02-04 PROCEDURE — 3008F BODY MASS INDEX DOCD: CPT | Performed by: FAMILY MEDICINE

## 2022-02-04 PROCEDURE — 99214 OFFICE O/P EST MOD 30 MIN: CPT | Performed by: FAMILY MEDICINE

## 2022-02-04 PROCEDURE — 1036F TOBACCO NON-USER: CPT | Performed by: FAMILY MEDICINE

## 2022-02-04 RX ORDER — MELATONIN
1000 DAILY
COMMUNITY

## 2022-02-04 RX ORDER — ASPIRIN 81 MG/1
81 TABLET ORAL DAILY
COMMUNITY

## 2022-02-04 NOTE — PATIENT INSTRUCTIONS
Try glucosamine with chondroitin to help with your joint pains  Follow the directions on the bottle as to how to take this  Some formulas are once a day  Try this for at least a couple of months before you decide if this is working  Tylenol arthritis as directed  Tinnitus, Ambulatory Care   GENERAL INFORMATION:   Tinnitus  is when you hear ringing, clicking, buzzing, or hissing in one or both ears  You may also hear whistling, chirping, or pulsing  It may be soft or loud, at a low pitch or high pitch  Tinnitus may be caused by problems with your hearing system  Your hearing system includes your ear, the nerve that connects your ear to your brain  The parts of your brain that sort out sounds are also part of your hearing system  Tinnitus may also be caused by health conditions, such as Ménière's disease  Tinnitus that lasts for longer than 6 months is considered chronic  Contact your healthcare provider for the following symptoms:   · Frequent headaches    · Tiredness and trouble concentrating or remembering things    · Increased anxiety or stress    · Deep sadness or depression    · Trouble falling asleep or staying asleep    · Symptoms that get worse or do not go away    · Questions or concerns about your condition or care  Treatment for tinnitus  You may not need treatment  Your symptoms may only appear when you are anxious or stressed  Your healthcare provider may stop certain medicines that may be causing your tinnitus  You may also need medicines to help decrease your symptoms  You may need any of the following:  · Counseling  can help you learn ways to relax, decrease stress, and make your tinnitus less noticeable  · Cognitive behavioral therapy  helps you understand your condition  Your therapist will help you learn to cope with tinnitus  You may also learn new ways to relax and retrain your behavior to decrease your symptoms      · Sound therapy , such as white noise machines, may help cover your tinnitus with a pleasant sound  Sound therapy devices can help you fall asleep or help you relax  These devices can be worn in your ear or placed next to your bed at night  · Hearing aids or cochlear implants  may help if you have hearing loss  · Surgery  may be needed if your tinnitus is caused by abnormal blood vessels or a mass  Manage your symptoms:   · Do not smoke  If you smoke, it is never too late to quit  Smoking decreases blood flow to your ear and can make your tinnitus worse  Ask for information if you need help quitting  · Decrease how much alcohol and caffeine you drink  Alcohol and caffeine can make your tinnitus worse  Prevent tinnitus:   · Avoid exposure to loud noise , such as loud music or power tools  Occasional exposure can still cause tinnitus  Move away from the noise or turn down the volume  · Wear ear protection  when you are exposed to loud noises  Good ear protection includes ear plugs or headphones that reduce noise  Follow up with your healthcare provider as directed:  Write down your questions so you remember to ask them during your visits  CARE AGREEMENT:   You have the right to help plan your care  Learn about your health condition and how it may be treated  Discuss treatment options with your caregivers to decide what care you want to receive  You always have the right to refuse treatment  The above information is an  only  It is not intended as medical advice for individual conditions or treatments  Talk to your doctor, nurse or pharmacist before following any medical regimen to see if it is safe and effective for you  © 2014 [address] is for End User's use only and may not be sold, redistributed or otherwise used for commercial purposes  All illustrations and images included in CareNotes® are the copyrighted property of A D A M , Inc  or Mariojeronimo Malave           Calm is an artemio - this has soothing background sounds like ocean sounds and can talk you through meditation to help you sleep  This artemio also has bedtime stories  [employer] is an artemio to help with meditation and relaxation       "sleep with me" is a podcast you can try -a nice boring voice telling you a boring story to help you sleep  Sleep Hygiene Tips  The most common cause of insomnia is a change in your daily routine  For example,  traveling, change in work hours, disruption of other behaviors (eating, exercise, leisure,  etc ), and relationship conflicts can all cause sleep problems  Paying attention to good  sleep hygiene is the most important thing you can do to maintain good sleep  Do:  1  Go to bed at the same time each day  2  Get up from bed at the same time each day  3  Get regular exercise each day, preferably in the morning  There is good evidence that  regular exercise improves restful sleep  This includes stretching and aerobic exercise  4  Get regular exposure to outdoor or bright lights, especially in the late afternoon  5  Keep the temperature in your bedroom comfortable  6  Keep the bedroom quiet when sleeping  7  Keep the bedroom dark enough to facilitate sleep  8  Use your bed only for sleep and sex  9  Take medications as directed  It is helpful to take prescribed sleeping pills 1 hour  before bedtime, so they are causing drowsiness when you lie down, or 10 hours  before getting up, to avoid daytime drowsiness  10  Use a relaxation exercise just before going to sleep   o Muscle relaxation, imagery, massage, warm bath, etc   11  Keep your feet and hands warm  Wear warm socks and/or mittens or gloves to bed  Don't:  1  Exercise just before going to bed    2  Engage in stimulating activity just before bed, such as playing a competitive game,  watching an exciting program on television or movie, or having an important  discussion with a loved one   3  Have caffeine in the evening (coffee, many teas, chocolate, sodas, etc )   4  Read or watch television in bed  5  Use alcohol to help you sleep  6  Go to bed too hungry or too full  7  Take another person's sleeping pills  8  Take over-the-counter sleeping pills, without your doctor's knowledge  Tolerance can  develop rapidly with these medications  Diphenhydramine (an ingredient commonly  found in over-the-counter sleep meds) can have serious side effects for elderly patients  9  Take daytime naps  10  Command yourself to go to sleep  This only makes your mind and body more alert  If you lie in bed awake for more than 20-30 minutes, get up, go to a different room (or different part of the bedroom), participate in a quiet activity (e g  non-excitable reading or television),and then return to bed when you feel sleepy  Do this as many times during the night as needed

## 2022-02-04 NOTE — PROGRESS NOTES
Assessment/Plan:   1  Tinnitus of both ears  Reassurance given     2  Situational anxiety  Worsening anxiety with caregiver burnout and guilt  Reviewed this and and encouraged her to seek self care   She does not feel like she would ever need help with something like medicaiton     3  Multiple joint pain  Add sed rate to labs due later this month   - Sedimentation rate, automated; Future  - Sedimentation rate, automated    4  Situational insomnia  Reviewed good sleep hygeine      Patient Instructions   Try glucosamine with chondroitin to help with your joint pains  Follow the directions on the bottle as to how to take this  Some formulas are once a day  Try this for at least a couple of months before you decide if this is working  Tylenol arthritis as directed  Tinnitus, Ambulatory Care   GENERAL INFORMATION:   Tinnitus  is when you hear ringing, clicking, buzzing, or hissing in one or both ears  You may also hear whistling, chirping, or pulsing  It may be soft or loud, at a low pitch or high pitch  Tinnitus may be caused by problems with your hearing system  Your hearing system includes your ear, the nerve that connects your ear to your brain  The parts of your brain that sort out sounds are also part of your hearing system  Tinnitus may also be caused by health conditions, such as Ménière's disease  Tinnitus that lasts for longer than 6 months is considered chronic  Contact your healthcare provider for the following symptoms:   · Frequent headaches    · Tiredness and trouble concentrating or remembering things    · Increased anxiety or stress    · Deep sadness or depression    · Trouble falling asleep or staying asleep    · Symptoms that get worse or do not go away    · Questions or concerns about your condition or care  Treatment for tinnitus  You may not need treatment  Your symptoms may only appear when you are anxious or stressed   Your healthcare provider may stop certain medicines that may be causing your tinnitus  You may also need medicines to help decrease your symptoms  You may need any of the following:  · Counseling  can help you learn ways to relax, decrease stress, and make your tinnitus less noticeable  · Cognitive behavioral therapy  helps you understand your condition  Your therapist will help you learn to cope with tinnitus  You may also learn new ways to relax and retrain your behavior to decrease your symptoms  · Sound therapy , such as white noise machines, may help cover your tinnitus with a pleasant sound  Sound therapy devices can help you fall asleep or help you relax  These devices can be worn in your ear or placed next to your bed at night  · Hearing aids or cochlear implants  may help if you have hearing loss  · Surgery  may be needed if your tinnitus is caused by abnormal blood vessels or a mass  Manage your symptoms:   · Do not smoke  If you smoke, it is never too late to quit  Smoking decreases blood flow to your ear and can make your tinnitus worse  Ask for information if you need help quitting  · Decrease how much alcohol and caffeine you drink  Alcohol and caffeine can make your tinnitus worse  Prevent tinnitus:   · Avoid exposure to loud noise , such as loud music or power tools  Occasional exposure can still cause tinnitus  Move away from the noise or turn down the volume  · Wear ear protection  when you are exposed to loud noises  Good ear protection includes ear plugs or headphones that reduce noise  Follow up with your healthcare provider as directed:  Write down your questions so you remember to ask them during your visits  CARE AGREEMENT:   You have the right to help plan your care  Learn about your health condition and how it may be treated  Discuss treatment options with your caregivers to decide what care you want to receive  You always have the right to refuse treatment  The above information is an  only   It is not intended as medical advice for individual conditions or treatments  Talk to your doctor, nurse or pharmacist before following any medical regimen to see if it is safe and effective for you  © 2014 [address] is for End User's use only and may not be sold, redistributed or otherwise used for commercial purposes  All illustrations and images included in CareNotes® are the copyrighted property of A D A M , Inc  or Mario Malave  Calm is an artemio - this has soothing background sounds like ocean sounds and can talk you through meditation to help you sleep  This artemio also has bedtime stories  HeadSpace is an artemio to help with meditation and relaxation       "sleep with me" is a podcast you can try -a nice boring voice telling you a boring story to help you sleep  Sleep Hygiene Tips  The most common cause of insomnia is a change in your daily routine  For example,  traveling, change in work hours, disruption of other behaviors (eating, exercise, leisure,  etc ), and relationship conflicts can all cause sleep problems  Paying attention to good  sleep hygiene is the most important thing you can do to maintain good sleep  Do:  1  Go to bed at the same time each day  2  Get up from bed at the same time each day  3  Get regular exercise each day, preferably in the morning  There is good evidence that  regular exercise improves restful sleep  This includes stretching and aerobic exercise  4  Get regular exposure to outdoor or bright lights, especially in the late afternoon  5  Keep the temperature in your bedroom comfortable  6  Keep the bedroom quiet when sleeping  7  Keep the bedroom dark enough to facilitate sleep  8  Use your bed only for sleep and sex  9  Take medications as directed  It is helpful to take prescribed sleeping pills 1 hour  before bedtime, so they are causing drowsiness when you lie down, or 10 hours  before getting up, to avoid daytime drowsiness    10  Use a relaxation exercise just before going to sleep   o Muscle relaxation, imagery, massage, warm bath, etc   11  Keep your feet and hands warm  Wear warm socks and/or mittens or gloves to bed  Don't:  1  Exercise just before going to bed  2  Engage in stimulating activity just before bed, such as playing a competitive game,  watching an exciting program on television or movie, or having an important  discussion with a loved one   3  Have caffeine in the evening (coffee, many teas, chocolate, sodas, etc )   4  Read or watch television in bed  5  Use alcohol to help you sleep  6  Go to bed too hungry or too full  7  Take another person's sleeping pills  8  Take over-the-counter sleeping pills, without your doctor's knowledge  Tolerance can  develop rapidly with these medications  Diphenhydramine (an ingredient commonly  found in over-the-counter sleep meds) can have serious side effects for elderly patients  9  Take daytime naps  10  Command yourself to go to sleep  This only makes your mind and body more alert  If you lie in bed awake for more than 20-30 minutes, get up, go to a different room (or different part of the bedroom), participate in a quiet activity (e g  non-excitable reading or television),and then return to bed when you feel sleepy  Do this as many times during the night as needed  No follow-ups on file  Subjective:    TIMUR Marcus is a 62 y o  female who presents with:  Chief Complaint     Tinnitus; Immunizations        HPI     Tinnitus      Additional comments: B/L RINGING               Immunizations      Additional comments: DECLINED TDAP, BOOSTER COVID, FLU           Last edited by Karl Perry MA on 2/4/2022 11:51 AM  (History)        ---Above per clinical staff & reviewed   ---          Today:  Started a little while ago   Notices a lot more at night   Scared   No pain   No trouble hearing   Has not been sick     Asked how she would know if she has anxiety     Arthritis has been bothering her stiffness in shoulders and joints   Using tumeric   Did not try anything else   Stretches first thing in the morning   Worse if sits too long   Active   Moderate exercise   Uses exercise ball   piliCabbi machine     Has been through a lot this year   No time alone   No one to help them     Making plans to move him back   Not talking to anyone about it     She is to have a grand child this May     The following portions of the patient's history were reviewed and updated as appropriate: allergies, current medications, past family history, past medical history, past social history, past surgical history and problem list   Review of Systems  ROS:  all others negative - no chest pain, SOB, normal urine and bowels  no GERD  Not sleeping well  mood anxious  Objective:    /60   Pulse (!) 119   Temp 99 °F (37 2 °C)   Resp 16   Ht 5' 1" (1 549 m)   Wt 72 1 kg (159 lb)   SpO2 99%   BMI 30 04 kg/m²   Wt Readings from Last 3 Encounters:   02/04/22 72 1 kg (159 lb)   10/19/21 68 5 kg (151 lb)   10/13/21 68 5 kg (151 lb)     BP Readings from Last 3 Encounters:   02/04/22 138/60   02/26/21 138/80   11/23/20 132/60       Current Medications:  Current Outpatient Medications   Medication Sig Dispense Refill    aspirin (Adult Aspirin Regimen) 81 mg EC tablet Take 81 mg by mouth daily      cholecalciferol (VITAMIN D3) 1,000 units tablet Take 1,000 Units by mouth daily      Turmeric (QC TUMERIC COMPLEX PO) Take 1 tablet by mouth in the morning       No current facility-administered medications for this visit  Physical Exam   Constitutional: she appears well-developed and well-nourished  HENT: Head: Normocephalic  Right Ear: External ear normal  Tympanic membrane normal    Left Ear: External ear normal  Tympanic membrane normal    Nose: Nose normal  No mucosal edema, No rhinorrhea  Right sinus exhibits no maxillary sinus tenderness  Left sinus exhibits no maxillary sinus tenderness  Mouth/Throat: Oropharynx is clear and moist    Eyes: Normal conjunctiva  No erythema  No discharge  Neck: No pain on exam  Neck supple  Cardiovascular: Normal rate, regular rhythm and normal heart sounds  Pulmonary/Chest: Effort normal and breath sounds normal    Abdominal: Soft  Bowel sounds are normal  There is no tenderness  Lymphadenopathy: she has no cervical adenopathy  Neurological: she is alert and oriented to person, place, and time  + bouchards nodes  Skin: Skin is warm and dry  Psychiatric: she has a normal mood and anious affect  her behavior is normal           BMI Counseling: Body mass index is 30 04 kg/m²  The BMI is above normal  Nutrition recommendations include decreasing portion sizes  Exercise recommendations include exercising 3-5 times per week  Rationale for BMI follow-up plan is due to patient being overweight or obese

## 2022-02-21 ENCOUNTER — TELEPHONE (OUTPATIENT)
Dept: FAMILY MEDICINE CLINIC | Facility: CLINIC | Age: 62
End: 2022-02-21

## 2022-03-29 ENCOUNTER — RA CDI HCC (OUTPATIENT)
Dept: OTHER | Facility: HOSPITAL | Age: 62
End: 2022-03-29

## 2022-03-29 NOTE — PROGRESS NOTES
[facility] 75  coding opportunities       Chart reviewed, no opportunity found: CHART REVIEWED, NO OPPORTUNITY FOUND        Patients Insurance        Commercial Insurance: Pola Peoples

## 2022-04-19 ENCOUNTER — TELEPHONE (OUTPATIENT)
Dept: FAMILY MEDICINE CLINIC | Facility: CLINIC | Age: 62
End: 2022-04-19

## 2022-04-19 DIAGNOSIS — N60.01 BREAST CYST, RIGHT: Primary | ICD-10-CM

## 2022-04-19 NOTE — TELEPHONE ENCOUNTER
Pt is schedule for her 6 mo follow up Russel Almaraz 59 of R breast tomorrow on 4/20  Pt was notified by central scheduling that she needs an order for each for her appointments

## 2022-04-20 ENCOUNTER — HOSPITAL ENCOUNTER (OUTPATIENT)
Dept: ULTRASOUND IMAGING | Facility: CLINIC | Age: 62
Discharge: HOME | End: 2022-04-20
Payer: COMMERCIAL

## 2022-04-20 ENCOUNTER — HOSPITAL ENCOUNTER (OUTPATIENT)
Dept: MAMMOGRAPHY | Facility: CLINIC | Age: 62
Discharge: HOME | End: 2022-04-20
Payer: COMMERCIAL

## 2022-04-20 DIAGNOSIS — N60.01 BREAST CYST, RIGHT: ICD-10-CM

## 2022-04-20 PROCEDURE — 76642 ULTRASOUND BREAST LIMITED: CPT

## 2022-04-20 PROCEDURE — 77065 DX MAMMO INCL CAD UNI: CPT

## 2022-04-20 PROCEDURE — G0279 TOMOSYNTHESIS, MAMMO: HCPCS

## 2022-05-05 ENCOUNTER — RA CDI HCC (OUTPATIENT)
Dept: OTHER | Facility: HOSPITAL | Age: 62
End: 2022-05-05

## 2022-05-05 NOTE — PROGRESS NOTES
[facility] 75  coding opportunities       Chart reviewed, no opportunity found: CHART REVIEWED, NO OPPORTUNITY FOUND        Patients Insurance        Commercial Insurance: Pola Peoples

## 2022-05-12 ENCOUNTER — OFFICE VISIT (OUTPATIENT)
Dept: FAMILY MEDICINE CLINIC | Facility: CLINIC | Age: 62
End: 2022-05-12
Payer: COMMERCIAL

## 2022-05-12 VITALS
DIASTOLIC BLOOD PRESSURE: 74 MMHG | OXYGEN SATURATION: 97 % | BODY MASS INDEX: 30.55 KG/M2 | WEIGHT: 161.8 LBS | RESPIRATION RATE: 16 BRPM | SYSTOLIC BLOOD PRESSURE: 120 MMHG | HEIGHT: 61 IN | HEART RATE: 86 BPM | TEMPERATURE: 97.9 F

## 2022-05-12 DIAGNOSIS — E78.00 HYPERCHOLESTEREMIA: ICD-10-CM

## 2022-05-12 DIAGNOSIS — E55.9 VITAMIN D DEFICIENCY: ICD-10-CM

## 2022-05-12 DIAGNOSIS — M18.0 PRIMARY OSTEOARTHRITIS OF BOTH FIRST CARPOMETACARPAL JOINTS: ICD-10-CM

## 2022-05-12 DIAGNOSIS — E66.09 CLASS 1 OBESITY DUE TO EXCESS CALORIES WITH SERIOUS COMORBIDITY AND BODY MASS INDEX (BMI) OF 30.0 TO 30.9 IN ADULT: ICD-10-CM

## 2022-05-12 DIAGNOSIS — Z00.00 WELL ADULT EXAM: Primary | ICD-10-CM

## 2022-05-12 PROCEDURE — 3008F BODY MASS INDEX DOCD: CPT | Performed by: FAMILY MEDICINE

## 2022-05-12 PROCEDURE — 1036F TOBACCO NON-USER: CPT | Performed by: FAMILY MEDICINE

## 2022-05-12 PROCEDURE — 3725F SCREEN DEPRESSION PERFORMED: CPT | Performed by: FAMILY MEDICINE

## 2022-05-12 PROCEDURE — 99396 PREV VISIT EST AGE 40-64: CPT | Performed by: FAMILY MEDICINE

## 2022-05-12 RX ORDER — GLUCOSAMINE SULFATE 500 MG
500 CAPSULE ORAL 2 TIMES DAILY
COMMUNITY

## 2022-05-12 NOTE — PROGRESS NOTES
Assessment/Plan:       1  Well adult exam  See below     2  Hypercholesteremia  Well controlled,ASCVD risk 2 8%  - Comprehensive metabolic panel; Future  - Lipid panel; Future  - Comprehensive metabolic panel  - Lipid panel    3  Vitamin D deficiency  Well controlled at 38   - Vitamin D 25 hydroxy; Future  - Vitamin D 25 hydroxy    4  Primary osteoarthritis of both first carpometacarpal joints  Stable   - CBC and differential; Future  - CBC and differential    5  Class 1 obesity due to excess calories with serious comorbidity and body mass index (BMI) of 30 0 to 30 9 in adult  Encouraged diet and exercise to help for a healthier BMI  Well adult exam  ·         Continue healthy diet   ·         Encourage exercise 4 times a week or more for minimum 30 minutes  ·         Continue to see dentist, wear seatbelt  ·         Health maintenance reviewed - declines vaccines  Referred to GYN last year  New referral given today  reviewed fasting blood work today  Reviewed age appropriate health maintenance screenings and immunizations that are due, risks and benefits of these     Health Maintenance   Topic Date Due    Cervical Cancer Screening  Never done    COVID-19 Vaccine (3 - Booster for Moderna series) 08/12/2022 (Originally 1/31/2022)    DTaP,Tdap,and Td Vaccines (1 - Tdap) 02/04/2023 (Originally [birth year]20/1982)    Influenza Vaccine (Season Ended) 09/01/2022    BMI: Followup Plan  02/04/2023    Breast Cancer Screening: Mammogram  04/20/2023    Depression Screening  05/12/2023    BMI: Adult  05/12/2023    Annual Physical  05/12/2023    Colorectal Cancer Screening  03/24/2026    HIV Screening  Completed    Hepatitis C Screening  Completed    Pneumococcal Vaccine: Pediatrics (0 to 5 Years) and At-Risk Patients (6 to 59 Years)  Aged Out    HIB Vaccine  Aged Out    Hepatitis B Vaccine  Aged Out    IPV Vaccine  Aged Out    Hepatitis A Vaccine  Aged Out    Meningococcal ACWY Vaccine  Aged Out    HPV Vaccine  Aged Out     Return in about 1 year (around 5/12/2023) for Annual physical     Subjective:    HPI     Magalis Rodriguez is a 62 y o  female who presents today for a physical      Chief Complaint   Patient presents with    Physical Exam     NO CONCERNS     Labs Only     LABS DONE     Other     PAP - REFERRAL PENDED    Immunizations     TDAP/SHINGREX - DECLINED VACCINES      PHQ-2/9 Depression Screening    Little interest or pleasure in doing things: 0 - not at all  Feeling down, depressed, or hopeless: 0 - not at all  PHQ-2 Score: 0  PHQ-2 Interpretation: Negative depression screen        ---Above per clinical staff & reviewed  ---  Patient here today for a physical:    Diet: tries   Exercise:  Some foot pain so not walking as much - to see podiatrist, known arthritis   Dental visits:  Yes   Seatbelt: yes     Concerns today:  Dad at brother's house now   In adult day care and getting adjusted   She and her  went to [place]   To schedule eye exam   Tylenol at night as needed for arthritis   [facility]iatrist at [facility]           The following portions of the patient's history were reviewed and updated as appropriate: allergies, current medications, past family history, past medical history, past social history, past surgical history and problem list      Current Medications:  Current Outpatient Medications   Medication Sig Dispense Refill    aspirin (ECOTRIN LOW STRENGTH) 81 mg EC tablet Take 81 mg by mouth daily      cholecalciferol (VITAMIN D3) 1,000 units tablet Take 1,000 Units by mouth daily      glucosamine 500 MG CAPS capsule Take 500 mg by mouth in the morning and 500 mg in the evening   Turmeric (QC TUMERIC COMPLEX PO) Take 1 tablet by mouth in the morning       No current facility-administered medications for this visit          Objective:      /74   Pulse 86   Temp 97 9 °F (36 6 °C)   Resp 16   Ht 5' 1" (1 549 m)   Wt 73 4 kg (161 lb 12 8 oz)   SpO2 97%   BMI 30 57 kg/m²   BP Readings from Last 3 Encounters:   05/12/22 120/74   02/04/22 138/60   02/26/21 138/80     Wt Readings from Last 3 Encounters:   05/12/22 73 4 kg (161 lb 12 8 oz)   02/04/22 72 1 kg (159 lb)   10/19/21 68 5 kg (151 lb)       Review of Systems  ROS:  all others negative - no chest pain, SOB, normal urine and bowels  no GERD  sleeping well  mood good  Physical Exam   Constitutional: she appears well-developed and well-nourished  HENT: Head: Normocephalic  Right Ear: External ear normal  Tympanic membrane normal    Left Ear: External ear normal  Tympanic membrane normal    Nose: Nose normal  No mucosal edema, No rhinorrhea  Right sinus exhibits no maxillary sinus tenderness  Left sinus exhibits no maxillary sinus tenderness  Mouth/Throat: Oropharynx is clear and moist    Eyes: Normal conjunctiva  No erythema  No discharge  Neck: No pain on exam  Neck supple  Cardiovascular: Normal rate, regular rhythm and normal heart sounds  Pulmonary/Chest: Effort normal and breath sounds normal  No wheezes  No rales  No rhonchi  Abdominal: Soft  Bowel sounds are normal  There is no tenderness  Musculoskeletal: she exhibits no edema  Lymphadenopathy: she has no cervical adenopathy  Neurological: she  is alert and oriented to person, place, and time  Skin: Skin is warm and dry  No rashes  Psychiatric: she  has a normal mood and affect  her behavior is normal  Thought content normal    Vitals reviewed  Depression Screening and Follow-up Plan: Patient was screened for depression during today's encounter  They screened negative with a PHQ-2 score of 0

## 2022-05-29 ENCOUNTER — PATIENT MESSAGE (OUTPATIENT)
Dept: FAMILY MEDICINE CLINIC | Facility: CLINIC | Age: 62
End: 2022-05-29
Payer: COMMERCIAL

## 2022-05-29 DIAGNOSIS — U07.1 COVID-19: Primary | ICD-10-CM

## 2022-05-29 LAB — SARS-COV-2 AG UPPER RESP QL IA: ABNORMAL

## 2022-05-29 PROCEDURE — 87811 SARS-COV-2 COVID19 W/OPTIC: CPT | Performed by: FAMILY MEDICINE

## 2022-05-31 NOTE — TELEPHONE ENCOUNTER
From: Eualecia July  To: Anaya Beal DO  Sent: 5/29/2022 10:44 AM EDT  Subject: Lina Sargent,  I wanted to let you know that I tested positive for Covid on 5/29/2022  I have been drinking plenty of fluids and coughing up a little phlegm  My temperature is 98 7 now  Please advise     Thank you,  Eulene July

## 2022-10-20 ENCOUNTER — HOSPITAL ENCOUNTER (OUTPATIENT)
Dept: RADIOLOGY | Age: 62
Discharge: HOME | End: 2022-10-20
Payer: COMMERCIAL

## 2022-10-20 VITALS — BODY MASS INDEX: 26.06 KG/M2 | WEIGHT: 138 LBS | HEIGHT: 61 IN

## 2022-10-20 DIAGNOSIS — Z12.31 SCREENING MAMMOGRAM, ENCOUNTER FOR: ICD-10-CM

## 2022-10-20 PROCEDURE — 77067 SCR MAMMO BI INCL CAD: CPT

## 2022-10-20 PROCEDURE — 77063 BREAST TOMOSYNTHESIS BI: CPT

## 2023-05-09 ENCOUNTER — RA CDI HCC (OUTPATIENT)
Dept: OTHER | Facility: HOSPITAL | Age: 63
End: 2023-05-09

## 2023-05-09 NOTE — PROGRESS NOTES
[facility]  coding opportunities       Chart reviewed, no opportunity found: CHART REVIEWED, NO OPPORTUNITY FOUND        Patients Insurance        Commercial Insurance: Pola Peoples

## 2023-05-11 LAB
25(OH)D3 SERPL-MCNC: 50 NG/ML (ref 30–100)
ALBUMIN SERPL-MCNC: 4.5 G/DL (ref 3.6–5.1)
ALBUMIN/GLOB SERPL: 1.6 (CALC) (ref 1–2.5)
ALP SERPL-CCNC: 78 U/L (ref 37–153)
ALT SERPL-CCNC: 12 U/L (ref 6–29)
AST SERPL-CCNC: 20 U/L (ref 10–35)
BASOPHILS # BLD AUTO: 18 CELLS/UL (ref 0–200)
BASOPHILS NFR BLD AUTO: 0.4 %
BILIRUB SERPL-MCNC: 0.7 MG/DL (ref 0.2–1.2)
BUN SERPL-MCNC: 19 MG/DL (ref 7–25)
BUN/CREAT SERPL: NORMAL (CALC) (ref 6–22)
CALCIUM SERPL-MCNC: 9.6 MG/DL (ref 8.6–10.4)
CHLORIDE SERPL-SCNC: 104 MMOL/L (ref 98–110)
CHOLEST SERPL-MCNC: 252 MG/DL
CHOLEST/HDLC SERPL: 3.3 (CALC)
CO2 SERPL-SCNC: 27 MMOL/L (ref 20–32)
CREAT SERPL-MCNC: 0.95 MG/DL (ref 0.5–1.05)
EOSINOPHIL # BLD AUTO: 60 CELLS/UL (ref 15–500)
EOSINOPHIL NFR BLD AUTO: 1.3 %
ERYTHROCYTE [DISTWIDTH] IN BLOOD BY AUTOMATED COUNT: 12.3 % (ref 11–15)
GFR/BSA.PRED SERPLBLD CYS-BASED-ARV: 68 ML/MIN/1.73M2
GLOBULIN SER CALC-MCNC: 2.9 G/DL (CALC) (ref 1.9–3.7)
GLUCOSE SERPL-MCNC: 99 MG/DL (ref 65–99)
HCT VFR BLD AUTO: 43.5 % (ref 35–45)
HDLC SERPL-MCNC: 76 MG/DL
HGB BLD-MCNC: 15 G/DL (ref 11.7–15.5)
LDLC SERPL CALC-MCNC: 154 MG/DL (CALC)
LYMPHOCYTES # BLD AUTO: 1173 CELLS/UL (ref 850–3900)
LYMPHOCYTES NFR BLD AUTO: 25.5 %
MCH RBC QN AUTO: 30.6 PG (ref 27–33)
MCHC RBC AUTO-ENTMCNC: 34.5 G/DL (ref 32–36)
MCV RBC AUTO: 88.8 FL (ref 80–100)
MONOCYTES # BLD AUTO: 391 CELLS/UL (ref 200–950)
MONOCYTES NFR BLD AUTO: 8.5 %
NEUTROPHILS # BLD AUTO: 2958 CELLS/UL (ref 1500–7800)
NEUTROPHILS NFR BLD AUTO: 64.3 %
NONHDLC SERPL-MCNC: 176 MG/DL (CALC)
PLATELET # BLD AUTO: 282 THOUSAND/UL (ref 140–400)
PMV BLD REES-ECKER: 9.5 FL (ref 7.5–12.5)
POTASSIUM SERPL-SCNC: 4.1 MMOL/L (ref 3.5–5.3)
PROT SERPL-MCNC: 7.4 G/DL (ref 6.1–8.1)
RBC # BLD AUTO: 4.9 MILLION/UL (ref 3.8–5.1)
SODIUM SERPL-SCNC: 141 MMOL/L (ref 135–146)
TRIGL SERPL-MCNC: 104 MG/DL
WBC # BLD AUTO: 4.6 THOUSAND/UL (ref 3.8–10.8)

## 2023-05-16 ENCOUNTER — OFFICE VISIT (OUTPATIENT)
Dept: FAMILY MEDICINE CLINIC | Facility: CLINIC | Age: 63
End: 2023-05-16

## 2023-05-16 VITALS
TEMPERATURE: 97.9 F | SYSTOLIC BLOOD PRESSURE: 120 MMHG | RESPIRATION RATE: 16 BRPM | HEART RATE: 98 BPM | HEIGHT: 61 IN | BODY MASS INDEX: 27.81 KG/M2 | DIASTOLIC BLOOD PRESSURE: 76 MMHG | WEIGHT: 147.31 LBS | OXYGEN SATURATION: 99 %

## 2023-05-16 DIAGNOSIS — E55.9 VITAMIN D DEFICIENCY: ICD-10-CM

## 2023-05-16 DIAGNOSIS — Z00.00 WELL ADULT EXAM: Primary | ICD-10-CM

## 2023-05-16 DIAGNOSIS — E78.00 HYPERCHOLESTEREMIA: ICD-10-CM

## 2023-05-16 NOTE — PROGRESS NOTES
Assessment/Plan:      1  Well adult exam  See below     2  Hypercholesteremia  Cholesterol increased but HDL also higher  Low ASCVD risk  The 10-year ASCVD risk score (Yoni MAN, et al , 2019) is: 3 1%    Values used to calculate the score:      Age: 65 years      Sex: Female      Is Non- : No      Diabetic: No      Tobacco smoker: No      Systolic Blood Pressure: 918 mmHg      Is BP treated: No      HDL Cholesterol: 76 mg/dL      Total Cholesterol: 252 mg/dL      3  Vitamin D deficiency  Well controlled       Well adult exam  ·         Continue healthy diet   ·         Encourage exercise 4 times a week or more for minimum 30 minutes  ·         Continue to see dentist, wear seatbelt  ·         Health maintenance reviewed - declines Tdap and all vaccines  Will schedule pap with NADEGE ALFARO  [facility]  Reviewed age appropriate health maintenance screenings and immunizations that are due, risks and benefits of these     Health Maintenance   Topic Date Due   • DTaP,Tdap,and Td Vaccines (1 - Tdap) Never done   • Cervical Cancer Screening  Never done   • COVID-19 Vaccine (3 - Booster for Moderna series) 10/26/2021   • Influenza Vaccine (Season Ended) 09/01/2023   • Breast Cancer Screening: Mammogram  10/20/2023   • Depression Screening  05/16/2024   • BMI: Followup Plan  05/16/2024   • BMI: Adult  05/16/2024   • Annual Physical  05/16/2024   • Colorectal Cancer Screening  03/24/2026   • HIV Screening  Completed   • Hepatitis C Screening  Completed   • Pneumococcal Vaccine: Pediatrics (0 to 5 Years) and At-Risk Patients (6 to 59 Years)  Aged Out   • HIB Vaccine  Aged Out   • IPV Vaccine  Aged Out   • Hepatitis A Vaccine  Aged Out   • Meningococcal ACWY Vaccine  Aged Out   • HPV Vaccine  Aged Out     Return in about 1 year (around 5/16/2024) for Annual physical     Subjective:    TIMUR Parson is a 65 y o  female who presents today for a physical      Chief Complaint   Patient presents with   • Physical Exam   • "HM     Declined Tdap, COVID booster will get pap done BMI due          Patient Care Team:  Mendy Encinas, DO as PCP - General (Family Medicine)    PHQ-2/9 Depression Screening    Little interest or pleasure in doing things: 0 - not at all  Feeling down, depressed, or hopeless: 0 - not at all  PHQ-2 Score: 0  PHQ-2 Interpretation: Negative depression screen        ?    ---Above per clinical staff & reviewed  ---  Patient here today for a physical:    Diet: back on track, was hard when her dad was in the hospital  Exercise:  Stretching every morning   Dental visits:  Yes   Seatbelt: yes   Sleeping about 7- 7 5 hours     Concerns today:  Since our last visit her father has   She is doing okay with the grief process  Some arthritis   multivitamin now   Mom  at 64, started at 55   To schedule with GYN           The following portions of the patient's history were reviewed and updated as appropriate: allergies, current medications, past family history, past medical history, past social history, past surgical history and problem list      Current Medications:  Current Outpatient Medications   Medication Sig Dispense Refill   • aspirin (ECOTRIN LOW STRENGTH) 81 mg EC tablet Take 81 mg by mouth daily     • cholecalciferol (VITAMIN D3) 1,000 units tablet Take 1,000 Units by mouth daily     • Multiple Vitamins-Minerals (MULTI COMPLETE PO) Take by mouth     • Turmeric (QC TUMERIC COMPLEX PO) Take 1 tablet by mouth in the morning       No current facility-administered medications for this visit          Objective:      /76   Pulse 98   Temp 97 9 °F (36 6 °C)   Resp 16   Ht 5' 1\" (1 549 m)   Wt 66 8 kg (147 lb 5 oz)   SpO2 99%   BMI 27 83 kg/m²   BP Readings from Last 3 Encounters:   23 120/76   22 120/74   22 138/60     Wt Readings from Last 3 Encounters:   23 66 8 kg (147 lb 5 oz)   10/20/22 62 6 kg (138 lb)   22 73 4 kg (161 lb 12 8 oz)       Review of Systems  ROS:  all " others negative - no chest pain, SOB, normal urine and bowels  no GERD  sleeping well  mood good  Physical Exam   Constitutional: she appears well-developed and well-nourished  HENT: Head: Normocephalic  Right Ear: External ear normal  Tympanic membrane normal    Left Ear: External ear normal  Tympanic membrane normal    Nose: Nose normal  No mucosal edema, No rhinorrhea  Right sinus exhibits no maxillary sinus tenderness  Left sinus exhibits no maxillary sinus tenderness  Mouth/Throat: Oropharynx is clear and moist    Eyes: Normal conjunctiva  No erythema  No discharge  Neck: No pain on exam  Neck supple  Cardiovascular: Normal rate, regular rhythm and normal heart sounds  Pulmonary/Chest: Effort normal and breath sounds normal  No wheezes  No rales  No rhonchi  Abdominal: Soft  Bowel sounds are normal  There is no tenderness  Musculoskeletal: she exhibits no edema  Lymphadenopathy: she has no cervical adenopathy  Neurological: she  is alert and oriented to person, place, and time  Skin: Skin is warm and dry  No rashes  Psychiatric: she  has a normal mood and affect  her behavior is normal  Thought content normal    Vitals reviewed  BMI Counseling: Body mass index is 27 83 kg/m²  The BMI is above normal  Nutrition recommendations include decreasing portion sizes  Exercise recommendations include exercising 3-5 times per week  Rationale for BMI follow-up plan is due to patient being overweight or obese  Depression Screening and Follow-up Plan: Patient was screened for depression during today's encounter  They screened negative with a PHQ-2 score of 0

## 2023-08-09 ENCOUNTER — PATIENT MESSAGE (OUTPATIENT)
Dept: FAMILY MEDICINE CLINIC | Facility: CLINIC | Age: 63
End: 2023-08-09

## 2023-08-09 ENCOUNTER — OFFICE VISIT (OUTPATIENT)
Dept: FAMILY MEDICINE CLINIC | Facility: CLINIC | Age: 63
End: 2023-08-09
Payer: COMMERCIAL

## 2023-08-09 VITALS
SYSTOLIC BLOOD PRESSURE: 118 MMHG | RESPIRATION RATE: 18 BRPM | HEART RATE: 96 BPM | DIASTOLIC BLOOD PRESSURE: 76 MMHG | TEMPERATURE: 97.6 F | WEIGHT: 150 LBS | BODY MASS INDEX: 28.32 KG/M2 | HEIGHT: 61 IN | OXYGEN SATURATION: 100 %

## 2023-08-09 DIAGNOSIS — B30.9 VIRAL CONJUNCTIVITIS OF BOTH EYES: ICD-10-CM

## 2023-08-09 DIAGNOSIS — H93.13 TINNITUS OF BOTH EARS: ICD-10-CM

## 2023-08-09 DIAGNOSIS — H69.83 DYSFUNCTION OF BOTH EUSTACHIAN TUBES: ICD-10-CM

## 2023-08-09 DIAGNOSIS — B34.9 VIRAL INFECTION, UNSPECIFIED: Primary | ICD-10-CM

## 2023-08-09 PROCEDURE — 99214 OFFICE O/P EST MOD 30 MIN: CPT | Performed by: FAMILY MEDICINE

## 2023-08-09 NOTE — PATIENT INSTRUCTIONS
Advise Varsha Roberto med sinus rinse kit, Mucinex, Claritin/Zyrtec/Allegra/Xyzal, Flonase / Nasacort nasal spray. Avoid decongestants if you have high blood pressure. Tinnitus   AMBULATORY CARE:   Tinnitus  is when you hear ringing, clicking, buzzing, or hissing in one or both ears. You may also hear whistling, chirping, or pulsing. It may be soft or loud, and at a low or high pitch. Tinnitus that lasts for longer than 6 months is considered chronic. Tinnitus may be caused by problems with your hearing system, including the parts of your brain that sort out sounds. Tinnitus may also be caused by a health condition, such as Ménière disease. Call 911 if: You feel like hurting yourself or others because of the constant noise. Contact your healthcare provider if:   You have headaches. You are tired and have trouble concentrating or remembering things. You have more anxiety or stress than usual.    You have deep sadness or depression. You have trouble falling asleep or staying asleep. Your symptoms do not go away or they get worse. You have questions or concerns about your condition or care. Treatment for tinnitus  may not be needed. Your symptoms may only appear when you are anxious or stressed. Your healthcare provider may stop certain medicines that may be causing your tinnitus. You may also need medicines to help decrease your symptoms. The following can help treat or manage tinnitus:  Counseling  can help you learn ways to relax, decrease stress, and make your tinnitus less noticeable. Cognitive behavioral therapy  helps you understand your condition. Your therapist will help you learn to cope with tinnitus. You may also learn new ways to relax and retrain your behavior to decrease your symptoms. Sound therapy,  such as white noise machines, may help cover your tinnitus with a pleasant sound. Sound therapy devices can help you fall asleep or help you relax.  These devices can be worn in your ear or placed next to your bed at night. Hearing aids or cochlear implants  may help if you have hearing loss. Surgery  may be needed if your tinnitus is caused by abnormal blood vessels or a mass. Do not smoke. Nicotine decreases blood flow to your ear and can make your tinnitus worse. Do not use e-cigarettes or smokeless tobacco in place of cigarettes or to help you quit. They still contain nicotine. Ask your healthcare provider for information if you currently smoke and need help quitting. Decrease how much alcohol and caffeine you drink. Alcohol and caffeine can make your tinnitus worse. Prevent tinnitus:   Avoid exposure to loud noise,  such as loud music or power tools. Occasional exposure can still cause tinnitus. Move away from the noise or turn down the volume. Wear ear protection  when you are exposed to loud noises. Good ear protection includes ear plugs or headphones that reduce noise. Follow up with your doctor as directed:  Write down your questions so you remember to ask them during your visits. © Copyright Sathish Nocaroline 2022 Information is for End User's use only and may not be sold, redistributed or otherwise used for commercial purposes. The above information is an  only. It is not intended as medical advice for individual conditions or treatments. Talk to your doctor, nurse or pharmacist before following any medical regimen to see if it is safe and effective for you.

## 2023-08-09 NOTE — PROGRESS NOTES
COVID-19 Outpatient Progress Note    Assessment/Plan:    Problem List Items Addressed This Visit    None  Visit Diagnoses     Viral infection, unspecified    -  Primary    Viral conjunctivitis of both eyes        Dysfunction of both eustachian tubes        Tinnitus of both ears             Disposition:     Risks and benefits of COVID-19 vaccination was discussed with patient. Discussed symptom directed medication options with patient. Viral Illness / Viral Conjunctivitis / B/L Eustachian Tube Dysfunction / B/L Tinnitus - Repeat COVID home test in 24 hours. Advise Maxine Harris med sinus rinse kit, Mucinex, Claritin/Zyrtec/Allegra/Xyzal, Flonase / Nasacort nasal spray. Avoid decongestants if you have high blood pressure. Use noise cancelling / white noise at bedtime for tinnitus. I have spent a total time of 20 minutes on the day of the encounter for this patient including discussing diagnostic results, discussing prognosis, risks and benefits of treatment options, instructions for management, patient and family education, importance of treatment compliance, risk factor reductions, impressions, counseling/coordination of care, documenting in the medical record, reviewing/ordering tests, medicine, procedures and obtaining or reviewing history. Encounter provider: Marcos Larios DO     Provider located at: [address] [place] [id number] [phone]     Recent Visits  No visits were found meeting these conditions. Showing recent visits within past 7 days and meeting all other requirements  Today's Visits  Date Type Provider Dept   08/09/23 Office Visit Js Muniz DO Pg  [id number] today's visits and meeting all other requirements  Future Appointments  No visits were found meeting these conditions.   Showing future appointments within next 150 days and meeting all other requirements     Subjective:   Peggy Yi Daniel Martinez is a 59 y.o. female who is concerned about COVID-19. Patient's symptoms include fatigue, rhinorrhea and cough (Productive). Patient denies fever, chills, congestion, sore throat, anosmia, loss of taste, shortness of breath, chest tightness, abdominal pain, nausea, vomiting, diarrhea, myalgias and headaches. - Date of symptom onset: 8/6/2023      COVID-19 vaccination status: Fully vaccinated (primary series)    Exposure:   Contact with a person who is under investigation (PUI) for or who is positive for COVID-19 within the last 14 days?: No    Hospitalized recently for fever and/or lower respiratory symptoms?: No      Currently a healthcare worker that is involved in direct patient care?: No      Works in a special setting where the risk of COVID-19 transmission may be high? (this may include long-term care, correctional and senior care facilities; homeless shelters; assisted-living facilities and group homes.): No      Resident in a special setting where the risk of COVID-19 transmission may be high? (this may include long-term care, correctional and senior care facilities; homeless shelters; assisted-living facilities and group homes.): No      Negative COVID home test 8/9/23. Had B/L clear eye D/C this AM.  Denies eye itching. Using Tylenol and Robitussin. Pushing fluids. Eating and drinking s difficulty. Lab Results   Component Value Date    [address] Positive (Patient Reported) (A) 05/29/2022       Review of Systems   Constitutional: Positive for fatigue. Negative for chills and fever. HENT: Positive for rhinorrhea. Negative for congestion and sore throat. Respiratory: Positive for cough (Productive). Negative for chest tightness and shortness of breath. Gastrointestinal: Negative for abdominal pain, diarrhea, nausea and vomiting. Musculoskeletal: Negative for myalgias. Neurological: Negative for headaches.      Current Outpatient Medications on File Prior to Visit   Medication Sig   • aspirin (ECOTRIN LOW STRENGTH) 81 mg EC tablet Take 81 mg by mouth daily   • cholecalciferol (VITAMIN D3) 1,000 units tablet Take 1,000 Units by mouth daily   • Multiple Vitamins-Minerals (MULTI COMPLETE PO) Take by mouth   • Turmeric (QC TUMERIC COMPLEX PO) Take 1 tablet by mouth in the morning       Objective:    /76   Pulse 96   Temp 97.6 °F (36.4 °C)   Resp 18   Ht 5' 1" (1.549 m)   Wt 68 kg (150 lb)   SpO2 100%   BMI 28.34 kg/m²        Physical Exam  Vitals and nursing note reviewed. Constitutional:       General: She is not in acute distress. Appearance: Normal appearance. She is well-developed. HENT:      Head: Normocephalic and atraumatic. Right Ear: Tympanic membrane, ear canal and external ear normal.      Left Ear: Tympanic membrane, ear canal and external ear normal.      Nose: Nose normal.      Mouth/Throat:      Mouth: Mucous membranes are moist.      Pharynx: Oropharynx is clear. No oropharyngeal exudate or posterior oropharyngeal erythema. Eyes:      General: Lids are normal.         Right eye: No discharge. Left eye: No discharge. Extraocular Movements: Extraocular movements intact. Conjunctiva/sclera:      Right eye: Right conjunctiva is injected. Left eye: Left conjunctiva is injected. Pupils: Pupils are equal, round, and reactive to light. Cardiovascular:      Rate and Rhythm: Normal rate and regular rhythm. Pulses: Normal pulses. Heart sounds: Normal heart sounds. No murmur heard. Pulmonary:      Effort: Pulmonary effort is normal. No respiratory distress. Breath sounds: Normal breath sounds. Musculoskeletal:         General: No swelling or tenderness. Cervical back: Neck supple. Right lower leg: No edema. Left lower leg: No edema. Lymphadenopathy:      Cervical: No cervical adenopathy. Skin:     Findings: No rash. Neurological:      General: No focal deficit present.       Mental Status: She is alert and oriented to person, place, and time.    Psychiatric:         Mood and Affect: Mood normal.       Liss Stanford DO

## 2023-08-25 ENCOUNTER — PATIENT MESSAGE (OUTPATIENT)
Dept: FAMILY MEDICINE CLINIC | Facility: CLINIC | Age: 63
End: 2023-08-25

## 2023-10-23 ENCOUNTER — HOSPITAL ENCOUNTER (OUTPATIENT)
Dept: RADIOLOGY | Age: 63
Discharge: HOME | End: 2023-10-23
Payer: COMMERCIAL

## 2023-10-23 VITALS — WEIGHT: 150 LBS | BODY MASS INDEX: 28.32 KG/M2 | HEIGHT: 61 IN

## 2023-10-23 DIAGNOSIS — Z12.31 VISIT FOR SCREENING MAMMOGRAM: ICD-10-CM

## 2023-10-23 PROCEDURE — 77067 SCR MAMMO BI INCL CAD: CPT

## 2023-10-23 PROCEDURE — 77063 BREAST TOMOSYNTHESIS BI: CPT

## 2024-06-21 ENCOUNTER — OFFICE VISIT (OUTPATIENT)
Age: 64
End: 2024-06-21
Payer: COMMERCIAL

## 2024-06-21 VITALS
HEART RATE: 101 BPM | HEIGHT: 61 IN | RESPIRATION RATE: 16 BRPM | SYSTOLIC BLOOD PRESSURE: 150 MMHG | DIASTOLIC BLOOD PRESSURE: 82 MMHG | WEIGHT: 150 LBS | BODY MASS INDEX: 28.32 KG/M2

## 2024-06-21 DIAGNOSIS — M21.41 ACQUIRED FLAT FOOT, RIGHT: ICD-10-CM

## 2024-06-21 DIAGNOSIS — M21.962 ACQUIRED DEFORMITY OF BOTH FEET: ICD-10-CM

## 2024-06-21 DIAGNOSIS — M21.42 ACQUIRED FLAT FOOT, LEFT: ICD-10-CM

## 2024-06-21 DIAGNOSIS — M72.2 PLANTAR FASCIITIS: Primary | ICD-10-CM

## 2024-06-21 DIAGNOSIS — M21.961 ACQUIRED DEFORMITY OF BOTH FEET: ICD-10-CM

## 2024-06-21 PROCEDURE — 99203 OFFICE O/P NEW LOW 30 MIN: CPT | Performed by: PODIATRIST

## 2024-06-21 RX ORDER — MELOXICAM 15 MG/1
15 TABLET ORAL DAILY
Qty: 30 TABLET | Refills: 0 | Status: SHIPPED | OUTPATIENT
Start: 2024-06-21 | End: 2024-07-21

## 2024-06-21 NOTE — PROGRESS NOTES
Assessment/Plan: Plantar fascia is.  Pain.  Acquired deformity foot bilateral.  Acquired pes planus bilateral.    Plan.  Chart reviewed.  PCP notes reviewed.  Patient examined.  At this time patient be started on Mobic.  This will help with inflammation and pain.  She will stretch daily.  In order to control deformity she would benefit from orthotics.  Her feet been casted for custom molded foot orthotics.  She will use these daily.  Return as needed for trigger point injection         Diagnoses and all orders for this visit:    Plantar fasciitis  -     meloxicam (MOBIC) 15 mg tablet; Take 1 tablet (15 mg total) by mouth daily  -     Device Prior Authorization; Future    Acquired deformity of both feet  -     meloxicam (MOBIC) 15 mg tablet; Take 1 tablet (15 mg total) by mouth daily  -     Device Prior Authorization; Future    Acquired flat foot, right  -     meloxicam (MOBIC) 15 mg tablet; Take 1 tablet (15 mg total) by mouth daily  -     Device Prior Authorization; Future    Acquired flat foot, left  -     meloxicam (MOBIC) 15 mg tablet; Take 1 tablet (15 mg total) by mouth daily  -     Device Prior Authorization; Future          Subjective: Patient has pain.  She has pain in her heel.  She has history of Planter fasciitis.  No history of trauma.    No Known Allergies      Current Outpatient Medications:     aspirin (ECOTRIN LOW STRENGTH) 81 mg EC tablet, Take 81 mg by mouth daily, Disp: , Rfl:     cholecalciferol (VITAMIN D3) 1,000 units tablet, Take 1,000 Units by mouth daily, Disp: , Rfl:     meloxicam (MOBIC) 15 mg tablet, Take 1 tablet (15 mg total) by mouth daily, Disp: 30 tablet, Rfl: 0    Multiple Vitamins-Minerals (MULTI COMPLETE PO), Take by mouth, Disp: , Rfl:     Turmeric (QC TUMERIC COMPLEX PO), Take 1 tablet by mouth in the morning, Disp: , Rfl:     Patient Active Problem List   Diagnosis    Carpal tunnel syndrome    Equinus contracture of ankle    Osteoarthritis of carpometacarpal (CMC) joint of  thumb    Pain in right foot    Pes planus    Tibialis posterior tendinitis    Family history of breast cancer in mother    Diverticulosis    Cataracts, bilateral    Hypercholesteremia    Vitamin D deficiency          Patient ID: Leanna Trivedi is a 63 y.o. female.    HPI    The following portions of the patient's history were reviewed and updated as appropriate:     family history includes Aneurysm in her father; Breast cancer (age of onset: 55) in her mother; Heart attack in her mother; Hypertension in her father; Insomnia in her brother; No Known Problems in her maternal grandfather, maternal grandmother, paternal aunt, paternal aunt, paternal grandfather, paternal grandmother, and son; Stroke in her father.      reports that she has never smoked. She has never used smokeless tobacco. She reports current alcohol use. She reports that she does not use drugs.    Vitals:    06/21/24 1048   BP: 150/82   Pulse: 101   Resp: 16       Review of Systems      Objective:  Patient's shoes and socks removed.   Foot Exam    General  General Appearance: appears stated age and healthy   Orientation: alert and oriented to person, place, and time   Affect: appropriate       Right Foot/Ankle     Inspection and Palpation  Tenderness: calcaneus tenderness, bony tenderness and plantar fascia   Swelling: dorsum and plantar fascia   Arch: pes planus  Hammertoes: fifth toe  Hallux limitus: yes    Neurovascular  Dorsalis pedis: 3+  Posterior tibial: 3+      Left Foot/Ankle      Inspection and Palpation  Tenderness: plantar fascia   Swelling: dorsum and plantar fascia   Arch: pes planus  Hammertoes: fifth toe  Hallux limitus: yes    Neurovascular  Dorsalis pedis: 3+  Posterior tibial: 3+      Physical Exam  Vitals and nursing note reviewed.   Constitutional:       Appearance: Normal appearance.   Cardiovascular:      Rate and Rhythm: Normal rate and regular rhythm.      Pulses:           Dorsalis pedis pulses are 3+ on the right side and 3+ on  the left side.        Posterior tibial pulses are 3+ on the right side and 3+ on the left side.   Musculoskeletal:      Right foot: Bony tenderness present.   Feet:      Comments: Patient is pronated in stance and gait.  Pain with palpation plantar fascia insertion.  Negative pain with compression of calcaneus.  Skin:     Capillary Refill: Capillary refill takes less than 2 seconds.   Neurological:      Mental Status: She is alert.

## 2024-06-24 ENCOUNTER — TELEPHONE (OUTPATIENT)
Age: 64
End: 2024-06-24

## 2024-06-24 NOTE — TELEPHONE ENCOUNTER
Caller: Leanna Trivedi     [facility] and[facility]    #: [phone]    Escalation: Care Patient does not want to move forward with orthotics. Please return call. Thank you

## 2024-06-24 NOTE — TELEPHONE ENCOUNTER
Did try and call patient had to LMOM . I told her I did get her message I will make a note of it but if she would like to talk furthur about it to call me back.

## 2024-08-22 ENCOUNTER — TELEPHONE (OUTPATIENT)
Age: 64
End: 2024-08-22

## 2024-08-22 NOTE — TELEPHONE ENCOUNTER
LMOM for patient to call back to go over the information about orthotics they would be covered at 100% but we do need a verbal to go forward with the process.

## 2024-11-13 ENCOUNTER — HOSPITAL ENCOUNTER (OUTPATIENT)
Dept: RADIOLOGY | Age: 64
Discharge: HOME | End: 2024-11-13
Payer: COMMERCIAL

## 2024-11-13 DIAGNOSIS — Z12.31 VISIT FOR SCREENING MAMMOGRAM: ICD-10-CM

## 2024-11-13 PROCEDURE — 77067 SCR MAMMO BI INCL CAD: CPT

## 2024-11-13 PROCEDURE — 77063 BREAST TOMOSYNTHESIS BI: CPT

## 2024-11-15 ENCOUNTER — RESULTS FOLLOW-UP (OUTPATIENT)
Dept: FAMILY MEDICINE CLINIC | Facility: CLINIC | Age: 64
End: 2024-11-15

## 2025-02-13 ENCOUNTER — OFFICE VISIT (OUTPATIENT)
Dept: FAMILY MEDICINE CLINIC | Facility: CLINIC | Age: 65
End: 2025-02-13
Payer: COMMERCIAL

## 2025-02-13 VITALS
HEIGHT: 61 IN | WEIGHT: 163.6 LBS | OXYGEN SATURATION: 99 % | SYSTOLIC BLOOD PRESSURE: 140 MMHG | BODY MASS INDEX: 30.89 KG/M2 | TEMPERATURE: 98.7 F | DIASTOLIC BLOOD PRESSURE: 92 MMHG | HEART RATE: 83 BPM

## 2025-02-13 DIAGNOSIS — H91.93 HEARING PROBLEM OF BOTH EARS: ICD-10-CM

## 2025-02-13 DIAGNOSIS — E78.00 HYPERCHOLESTEREMIA: ICD-10-CM

## 2025-02-13 DIAGNOSIS — Z12.4 SCREENING FOR CERVICAL CANCER: ICD-10-CM

## 2025-02-13 DIAGNOSIS — H61.23 BILATERAL IMPACTED CERUMEN: Primary | ICD-10-CM

## 2025-02-13 DIAGNOSIS — E55.9 VITAMIN D DEFICIENCY: ICD-10-CM

## 2025-02-13 PROCEDURE — 99214 OFFICE O/P EST MOD 30 MIN: CPT | Performed by: FAMILY MEDICINE

## 2025-02-13 PROCEDURE — 69210 REMOVE IMPACTED EAR WAX UNI: CPT | Performed by: FAMILY MEDICINE

## 2025-02-13 NOTE — ASSESSMENT & PLAN NOTE
Update labs prior to visit for physical  Orders:  •  Comprehensive metabolic panel; Future  •  Lipid panel; Future  •  Hemoglobin A1C; Future  •  Vitamin D 25 hydroxy; Future

## 2025-02-13 NOTE — PROGRESS NOTES
Assessment/Plan:     Assessment & Plan  Bilateral impacted cerumen  Ears cleaned with irrigation and curettage.  After procedure small amount of cerumen remains but tympanic membrane visible.  Advised patient if she is still having trouble hearing we can refer her to an audiologist.  Orders:  •  Ear cerumen removal    Hearing problem of both ears  Ears cleaned with irrigation and curettage.  After procedure small amount of cerumen remains but tympanic membrane visible.  Advised patient if she is still having trouble hearing we can refer her to an audiologist.  Orders:  •  Comprehensive metabolic panel; Future  •  Lipid panel; Future  •  Hemoglobin A1C; Future  •  Vitamin D 25 hydroxy; Future    Vitamin D deficiency  Update labs prior to visit for physical  Orders:  •  Comprehensive metabolic panel; Future  •  Lipid panel; Future  •  Hemoglobin A1C; Future  •  Vitamin D 25 hydroxy; Future    Hypercholesteremia  Update labs prior to visit for physical  Orders:  •  Comprehensive metabolic panel; Future  •  Lipid panel; Future  •  Hemoglobin A1C; Future  •  Vitamin D 25 hydroxy; Future    Screening for cervical cancer  Refer to GYN  Update labs prior to visit for physical  Orders:  •  Ambulatory referral to Obstetrics / Gynecology; Future  •  Comprehensive metabolic panel; Future  •  Lipid panel; Future  •  Hemoglobin A1C; Future  •  Vitamin D 25 hydroxy; Future         Patient Instructions   To soften and help remove ear wax use:  Debrox 5 - 10 drops to ears daily for at least 7 days prior to visit.   Or make your own solution with 50% alcohol and 50% peroxide.     Return for Annual physical.  Subjective:    TIMUR Ram is a 64 y.o. female who presents with:  Chief Complaint    Hearing Problem       HPI     Hearing Problem     Additional comments: Ears stuffed up          Last edited by Leana Acevedo MA on 2/13/2025  9:29 AM.        ---Above per clinical staff & reviewed. ---        Today:  Last month flight and  "then ears popped   Feels underwater and cannot hear well   No URI symptoms     The following portions of the patient's history were reviewed and updated as appropriate: allergies, current medications, past family history, past medical history, past social history, past surgical history and problem list.  Review of Systems  ROS:  all others negative - no chest pain, SOB, normal urine and bowels. no GERD. sleeping well. mood mild anxiety.  Difficulty with hearing.  Objective:    /92 (Patient Position: Sitting, Cuff Size: Standard)   Pulse 83   Temp 98.7 °F (37.1 °C) (Temporal)   Ht 5' 1\" (1.549 m)   Wt 74.2 kg (163 lb 9.6 oz)   SpO2 99%   BMI 30.91 kg/m²   Wt Readings from Last 3 Encounters:   02/13/25 74.2 kg (163 lb 9.6 oz)   06/21/24 68 kg (150 lb)   10/23/23 68 kg (150 lb)     BP Readings from Last 3 Encounters:   02/13/25 140/92   06/21/24 150/82   08/09/23 118/76       Current Medications:  Current Outpatient Medications   Medication Sig Dispense Refill   • aspirin (ECOTRIN LOW STRENGTH) 81 mg EC tablet Take 81 mg by mouth daily     • cholecalciferol (VITAMIN D3) 1,000 units tablet Take 1,000 Units by mouth daily     • Multiple Vitamins-Minerals (MULTI COMPLETE PO) Take by mouth     • Turmeric (QC TUMERIC COMPLEX PO) Take 1 tablet by mouth in the morning     • meloxicam (MOBIC) 15 mg tablet Take 1 tablet (15 mg total) by mouth daily (Patient not taking: Reported on 2/13/2025) 30 tablet 0     No current facility-administered medications for this visit.        Physical Exam   Constitutional: she appears well-developed and well-nourished.   HENT: Head: Normocephalic.   Right Ear: External ear normal. Tympanic membrane cerumen impaction  Left Ear: External ear normal. Tympanic membrane cerumen impaction  Nose: Nose normal. No mucosal edema, No rhinorrhea.   Right sinus exhibits no maxillary sinus tenderness.   Left sinus exhibits no maxillary sinus tenderness.   Mouth/Throat: Oropharynx is clear and " moist.   Eyes: Normal conjunctiva.  No erythema. No discharge.  Neck: No pain on exam. Neck supple.   Psychiatric: she has a normal mood and affect. her behavior is normal.      Ear cerumen removal    Date/Time: 2/13/2025 9:20 AM    Performed by: Antoinette Mary DO  Authorized by: Antoinette Mary DO    Patient location:  Clinic  Procedure details:     Location:  Both ears    Procedure type: irrigation with instrumentation      Instrumentation: curette      Approach:  External    Equipment used:  OtoClear Spray Wash with curette  Post-procedure details:     Complication:  None    Patient tolerance of procedure:  Tolerated well, no immediate complications

## 2025-02-13 NOTE — PATIENT INSTRUCTIONS
To soften and help remove ear wax use:  Debrox 5 - 10 drops to ears daily for at least 7 days prior to visit.   Or make your own solution with 50% alcohol and 50% peroxide.

## 2025-03-15 LAB
25(OH)D3 SERPL-MCNC: 56 NG/ML (ref 30–100)
ALBUMIN SERPL-MCNC: 4.4 G/DL (ref 3.6–5.1)
ALBUMIN/GLOB SERPL: 1.6 (CALC) (ref 1–2.5)
ALP SERPL-CCNC: 69 U/L (ref 37–153)
ALT SERPL-CCNC: 15 U/L (ref 6–29)
AST SERPL-CCNC: 19 U/L (ref 10–35)
BILIRUB SERPL-MCNC: 0.5 MG/DL (ref 0.2–1.2)
BUN SERPL-MCNC: 22 MG/DL (ref 7–25)
BUN/CREAT SERPL: NORMAL (CALC) (ref 6–22)
CALCIUM SERPL-MCNC: 9.4 MG/DL (ref 8.6–10.4)
CHLORIDE SERPL-SCNC: 104 MMOL/L (ref 98–110)
CHOLEST SERPL-MCNC: 235 MG/DL
CHOLEST/HDLC SERPL: 3.6 (CALC)
CO2 SERPL-SCNC: 30 MMOL/L (ref 20–32)
CREAT SERPL-MCNC: 0.97 MG/DL (ref 0.5–1.05)
GFR/BSA.PRED SERPLBLD CYS-BASED-ARV: 66 ML/MIN/1.73M2
GLOBULIN SER CALC-MCNC: 2.7 G/DL (CALC) (ref 1.9–3.7)
GLUCOSE SERPL-MCNC: 96 MG/DL (ref 65–99)
HDLC SERPL-MCNC: 65 MG/DL
LDLC SERPL CALC-MCNC: 144 MG/DL (CALC)
NONHDLC SERPL-MCNC: 170 MG/DL (CALC)
POTASSIUM SERPL-SCNC: 4.2 MMOL/L (ref 3.5–5.3)
PROT SERPL-MCNC: 7.1 G/DL (ref 6.1–8.1)
SODIUM SERPL-SCNC: 141 MMOL/L (ref 135–146)
TRIGL SERPL-MCNC: 137 MG/DL

## 2025-03-16 ENCOUNTER — RESULTS FOLLOW-UP (OUTPATIENT)
Dept: FAMILY MEDICINE CLINIC | Facility: CLINIC | Age: 65
End: 2025-03-16

## 2025-06-17 ENCOUNTER — RA CDI HCC (OUTPATIENT)
Dept: OTHER | Facility: HOSPITAL | Age: 65
End: 2025-06-17

## 2025-06-17 NOTE — PROGRESS NOTES
HCC coding opportunities       Chart reviewed, no opportunity found: CHART REVIEWED, NO OPPORTUNITY FOUND        Patients Insurance        Commercial Insurance: [employer]

## 2025-06-24 ENCOUNTER — OFFICE VISIT (OUTPATIENT)
Dept: FAMILY MEDICINE CLINIC | Facility: CLINIC | Age: 65
End: 2025-06-24
Payer: COMMERCIAL

## 2025-06-24 ENCOUNTER — HOSPITAL ENCOUNTER (OUTPATIENT)
Dept: RADIOLOGY | Facility: HOSPITAL | Age: 65
Discharge: HOME | End: 2025-06-24
Payer: COMMERCIAL

## 2025-06-24 VITALS
BODY MASS INDEX: 29.94 KG/M2 | TEMPERATURE: 98.4 F | HEART RATE: 83 BPM | WEIGHT: 158.6 LBS | HEIGHT: 61 IN | DIASTOLIC BLOOD PRESSURE: 88 MMHG | SYSTOLIC BLOOD PRESSURE: 140 MMHG | OXYGEN SATURATION: 97 %

## 2025-06-24 DIAGNOSIS — Z00.00 WELL ADULT EXAM: Primary | ICD-10-CM

## 2025-06-24 DIAGNOSIS — M25.541 ARTHRALGIA OF BOTH HANDS: ICD-10-CM

## 2025-06-24 DIAGNOSIS — M79.674 PAIN IN RIGHT TOE(S): ICD-10-CM

## 2025-06-24 DIAGNOSIS — M25.561 PAIN AND SWELLING OF RIGHT KNEE: ICD-10-CM

## 2025-06-24 DIAGNOSIS — E78.00 HYPERCHOLESTEREMIA: ICD-10-CM

## 2025-06-24 DIAGNOSIS — Z78.9 TAKES DIETARY SUPPLEMENTS: ICD-10-CM

## 2025-06-24 DIAGNOSIS — E55.9 VITAMIN D DEFICIENCY: ICD-10-CM

## 2025-06-24 DIAGNOSIS — M25.461 PAIN AND SWELLING OF RIGHT KNEE: ICD-10-CM

## 2025-06-24 DIAGNOSIS — M25.542 ARTHRALGIA OF BOTH HANDS: ICD-10-CM

## 2025-06-24 PROBLEM — Z53.20 MAMMOGRAM DECLINED: Status: ACTIVE | Noted: 2025-06-24

## 2025-06-24 PROBLEM — Z53.20 MAMMOGRAM DECLINED: Status: RESOLVED | Noted: 2025-06-24 | Resolved: 2025-06-24

## 2025-06-24 PROBLEM — Z28.21 IMMUNIZATION DECLINED: Status: ACTIVE | Noted: 2025-06-24

## 2025-06-24 PROBLEM — Z53.20 PAP SMEAR OF CERVIX DECLINED: Status: ACTIVE | Noted: 2025-06-24

## 2025-06-24 PROCEDURE — 73660 X-RAY EXAM OF TOE(S): CPT

## 2025-06-24 PROCEDURE — 73562 X-RAY EXAM OF KNEE 3: CPT

## 2025-06-24 PROCEDURE — 99396 PREV VISIT EST AGE 40-64: CPT | Performed by: FAMILY MEDICINE

## 2025-06-24 PROCEDURE — 99214 OFFICE O/P EST MOD 30 MIN: CPT | Performed by: FAMILY MEDICINE

## 2025-06-24 PROCEDURE — 73130 X-RAY EXAM OF HAND: CPT

## 2025-06-24 RX ORDER — MAGNESIUM OXIDE 400 MG/1
400 TABLET ORAL DAILY
COMMUNITY
Start: 2025-06-24

## 2025-06-24 NOTE — PROGRESS NOTES
Adult Annual Physical  Name: Leanna Trivedi      : 1960      MRN: [id number]  Encounter Provider: Antoinette Mary DO  Encounter Date: 2025   Encounter department: [facility]    :  Assessment & Plan  Well adult exam  See below        Hypercholesteremia  Improved from last year with ASCVD risk 5%   Order fasting blood work to be done prior to visit in one year        Vitamin D deficiency  Well controlled on supplement        Takes dietary supplements  Call with magnesium dose   Orders:    magnesium oxide (MAG-OX) 400 mg tablet; Take 1 tablet (400 mg total) by mouth daily    Pain and swelling of right knee  Check xrays   Consider blood work and refer after results   Orders:    XR knee 3 vw right non injury; Future    XR knee 3 vw left non injury; Future    Arthralgia of both hands  Check xrays   Consider blood work and refer after results   Orders:    XR hand 3+ vw right; Future    XR hand 3+ vw left; Future    Pain in right toe(s)  Check xrays   Consider blood work and refer after results   Orders:    XR toe right great min 2 view; Future      Assessment & Plan    Repeat blood pressure   Checks at home, normal there     Preventive Screenings:    - Breast cancer screening: screening up-to-date     Well adult exam  ·         Continue healthy diet   ·         Encourage exercise 4 times a week or more for minimum 30 minutes.   ·         Continue to see dentist, wear seatbelt.  ·         Health maintenance reviewed  -decliens vaccines, pap  Mammo scheduled.    Reviewed age appropriate health maintenance screenings and immunizations that are due, risks and benefits of these.   Health Maintenance   Topic Date Due    Annual Physical  2024    Influenza Vaccine (Season Ended) 2025    Cervical Cancer Screening  2025 (Originally 1981)    COVID-19 Vaccine (3 - - season) 2025 (Originally 2024)    DTaP,Tdap,and Td Vaccines (1 - Tdap) 2026  "(Originally 7/20/1982)    Zoster Vaccine (1 of 2) 06/24/2026 (Originally 7/20/2011)    Pneumococcal Vaccine: 50+ Years (1 of 1 - PCV) 06/24/2026 (Originally 7/20/2011)    Breast Cancer Screening: Mammogram  11/13/2025    Colorectal Cancer Screening  03/24/2026    Depression Screening  06/24/2026    RSV Vaccine for Pregnant Patients and Patients Age 60+ Years (1 - 1-dose 75+ series) 07/20/2036    HIV Screening  Completed    Hepatitis C Screening  Completed    Meningococcal B Vaccine  Aged Out    RSV Vaccine age 0-20 Months  Aged Out    HIB Vaccine  Aged Out    IPV Vaccine  Aged Out    Hepatitis A Vaccine  Aged Out    Meningococcal ACWY Vaccine  Aged Out    HPV Vaccine  Aged Out          Follow up:  No follow-ups on file.      Leanna is a 64 y.o. female who presents today for a physical.   Chief Complaint   Patient presents with    Physical Exam     Would like to discuss arthritis     History of Present Illness   Adult Annual Physical    Diet: healthy   Exercise:  yes   Dental visits:  yes   Sleep: 8 hours, good quality    Concerns today:  Joint pain stiff in the morning   Better once she is moving   Stretching daily to help   Not stopping her   Deformities in hands           Review of Systems  ROS:  all others negative - no chest pain, SOB, normal urine and bowels. no GERD. sleeping well. mood good.     PHQ-2/9 Depression Screening    Little interest or pleasure in doing things: 0 - not at all  Feeling down, depressed, or hopeless: 0 - not at all  PHQ-2 Score: 0  PHQ-2 Interpretation: Negative depression screen            Objective   /86 (Patient Position: Sitting, Cuff Size: Standard)   Pulse 83   Temp 98.4 °F (36.9 °C) (Temporal)   Ht 5' 1\" (1.549 m)   Wt 71.9 kg (158 lb 9.6 oz)   SpO2 97%   BMI 29.97 kg/m²     /86 (Patient Position: Sitting, Cuff Size: Standard)   Pulse 83   Temp 98.4 °F (36.9 °C) (Temporal)   Ht 5' 1\" (1.549 m)   Wt 71.9 kg (158 lb 9.6 oz)   SpO2 97%   BMI 29.97 kg/m² "     BP Readings from Last 3 Encounters:   06/24/25 142/86   02/13/25 140/92   06/21/24 150/82     Wt Readings from Last 3 Encounters:   06/24/25 71.9 kg (158 lb 9.6 oz)   02/13/25 74.2 kg (163 lb 9.6 oz)   06/21/24 68 kg (150 lb)       Physical Exam  Constitutional: she appears well-developed and well-nourished.   HENT: Head: Normocephalic.   Right Ear: External ear normal. Cerumen impaction.   Left Ear: External ear normal.  Cerumen impaction.   Nose: Nose normal. No mucosal edema, No rhinorrhea.   Right sinus exhibits no maxillary sinus tenderness.   Left sinus exhibits no maxillary sinus tenderness.   Mouth/Throat: Oropharynx is clear and moist.   Eyes: Normal conjunctiva.  No erythema. No discharge.  Neck: No pain on exam. Neck supple.   Cardiovascular: Normal rate, regular rhythm and normal heart sounds.    Pulmonary/Chest: Effort normal and breath sounds normal. No wheezes. No rales. No rhonchi.   Abdominal: Soft. Bowel sounds are normal. There is no tenderness.   Musculoskeletal: she exhibits no edema. + joint deformities bilateral hands, right  first toe. Crepitus bilateral knees. Right knee + edema, no increased warmth.           Lymphadenopathy: she has no cervical adenopathy.   Neurological: she  is alert and oriented to person, place, and time.   Skin: Skin is warm and dry. No rashes.  Psychiatric: she  has a normal mood and affect. her behavior is normal. Thought content normal.   Vitals reviewed.      Current Medications:  Current Medications[1]             [1]   Current Outpatient Medications   Medication Sig Dispense Refill    aspirin (ECOTRIN LOW STRENGTH) 81 mg EC tablet Take 81 mg by mouth in the morning.      cholecalciferol (VITAMIN D3) 1,000 units tablet Take 1,000 Units by mouth in the morning.      Turmeric (QC TUMERIC COMPLEX PO) Take 1 tablet by mouth in the morning      Multiple Vitamins-Minerals (MULTI COMPLETE PO) Take by mouth       No current facility-administered medications for this  visit.

## 2025-06-24 NOTE — ASSESSMENT & PLAN NOTE
Improved from last year with ASCVD risk 5%   Order fasting blood work to be done prior to visit in one year

## 2025-06-27 ENCOUNTER — TELEPHONE (OUTPATIENT)
Age: 65
End: 2025-06-27

## 2025-06-27 NOTE — TELEPHONE ENCOUNTER
Pt requested that her recent lab orders be sent to her Mychart so she can make an appt. Thank you.